# Patient Record
Sex: FEMALE | Race: WHITE | NOT HISPANIC OR LATINO | Employment: FULL TIME | URBAN - NONMETROPOLITAN AREA
[De-identification: names, ages, dates, MRNs, and addresses within clinical notes are randomized per-mention and may not be internally consistent; named-entity substitution may affect disease eponyms.]

---

## 2023-03-08 LAB — URINE CULTURE: ABNORMAL

## 2023-03-13 PROBLEM — F17.200 NICOTINE DEPENDENCE: Status: ACTIVE | Noted: 2023-03-13

## 2023-03-13 PROBLEM — O02.1 ABORTION, MISSED (HHS-HCC): Status: ACTIVE | Noted: 2023-03-13

## 2023-03-13 PROBLEM — B37.9 YEAST INFECTION: Status: ACTIVE | Noted: 2023-03-13

## 2023-03-13 PROBLEM — I73.9 CLAUDICATION OF RIGHT LOWER EXTREMITY (CMS-HCC): Status: ACTIVE | Noted: 2023-03-13

## 2023-03-13 PROBLEM — F33.9 DEPRESSION, MAJOR, RECURRENT (CMS-HCC): Status: ACTIVE | Noted: 2023-03-13

## 2023-03-13 PROBLEM — L02.91 ABSCESS: Status: ACTIVE | Noted: 2023-03-13

## 2023-03-13 PROBLEM — J01.90 SINUSITIS, ACUTE: Status: ACTIVE | Noted: 2023-03-13

## 2023-03-13 PROBLEM — E28.2 POLYCYSTIC OVARIES: Status: ACTIVE | Noted: 2023-03-13

## 2023-03-13 RX ORDER — MUPIROCIN 20 MG/G
OINTMENT TOPICAL
COMMUNITY
Start: 2022-11-15 | End: 2024-06-06 | Stop reason: ALTCHOICE

## 2023-03-13 RX ORDER — SERTRALINE HYDROCHLORIDE 25 MG/1
1 TABLET, FILM COATED ORAL DAILY
COMMUNITY
Start: 2022-09-01 | End: 2023-03-16

## 2023-03-13 RX ORDER — ETONOGESTREL AND ETHINYL ESTRADIOL VAGINAL RING .015; .12 MG/D; MG/D
RING VAGINAL
COMMUNITY
Start: 2022-07-21 | End: 2024-04-01 | Stop reason: WASHOUT

## 2023-03-16 ENCOUNTER — OFFICE VISIT (OUTPATIENT)
Dept: PRIMARY CARE | Facility: CLINIC | Age: 23
End: 2023-03-16
Payer: COMMERCIAL

## 2023-03-16 VITALS
HEIGHT: 65 IN | BODY MASS INDEX: 40.65 KG/M2 | OXYGEN SATURATION: 97 % | SYSTOLIC BLOOD PRESSURE: 113 MMHG | WEIGHT: 244 LBS | HEART RATE: 75 BPM | DIASTOLIC BLOOD PRESSURE: 77 MMHG

## 2023-03-16 DIAGNOSIS — F41.9 ANXIETY: ICD-10-CM

## 2023-03-16 DIAGNOSIS — F33.1 MODERATE EPISODE OF RECURRENT MAJOR DEPRESSIVE DISORDER (MULTI): Primary | ICD-10-CM

## 2023-03-16 PROCEDURE — 3008F BODY MASS INDEX DOCD: CPT | Performed by: PHYSICIAN ASSISTANT

## 2023-03-16 PROCEDURE — 99214 OFFICE O/P EST MOD 30 MIN: CPT | Performed by: PHYSICIAN ASSISTANT

## 2023-03-16 PROCEDURE — 1036F TOBACCO NON-USER: CPT | Performed by: PHYSICIAN ASSISTANT

## 2023-03-16 RX ORDER — ONDANSETRON 8 MG/1
TABLET, ORALLY DISINTEGRATING ORAL
COMMUNITY
Start: 2023-03-10 | End: 2023-11-10

## 2023-03-16 RX ORDER — BUSPIRONE HYDROCHLORIDE 5 MG/1
TABLET ORAL
Qty: 130 TABLET | Refills: 1 | Status: SHIPPED | OUTPATIENT
Start: 2023-03-16 | End: 2023-03-16

## 2023-03-16 RX ORDER — KETOROLAC TROMETHAMINE 10 MG/1
TABLET, FILM COATED ORAL
COMMUNITY
Start: 2023-03-07 | End: 2024-05-22 | Stop reason: WASHOUT

## 2023-03-16 RX ORDER — CIPROFLOXACIN 500 MG/1
TABLET ORAL
COMMUNITY
Start: 2023-03-07 | End: 2024-01-23 | Stop reason: ALTCHOICE

## 2023-03-16 RX ORDER — SERTRALINE HYDROCHLORIDE 50 MG/1
50 TABLET, FILM COATED ORAL DAILY
Qty: 30 TABLET | Refills: 11 | Status: SHIPPED | OUTPATIENT
Start: 2023-03-16 | End: 2023-03-16

## 2023-03-16 NOTE — PROGRESS NOTES
Subjective   Patient ID: Louise Santoro is a 22 y.o. female who presents for Follow-up (Hospital F/U.).    HPI     Review of Systems    Objective   There were no vitals taken for this visit.    Physical Exam    Assessment/Plan

## 2023-03-16 NOTE — PROGRESS NOTES
"Subjective   Patient ID: Louise Santoro is a 22 y.o. female who presents for Follow-up (Hospital F/U.).  HPI  Patient presents for follow-up of UTI.  Patient was diagnosed UTI last week and treated with Cipro.  Culture was inconclusive.  Patient reports good results with Cipro    Patient requesting increase in sertraline and possible addition of another agent for anxiety.  Patient reports having significant increase in stress load while taking care of an ailing family member as well as getting a promotion at work.  Review of Systems    Constitutional:  See HPI     Neurologic:  Alert and oriented X4, No numbness, No tingling.    Psychiatric: See HPI  All other systems are negative     Objective     /77   Pulse 75   Ht 1.651 m (5' 5\")   Wt 111 kg (244 lb)   SpO2 97%   BMI 40.60 kg/m²     Physical Exam    General:  Alert and oriented, No acute distress.    Eye:  Pupils are equal, round and reactive to light, Normal conjunctiva.    HENT:  Normocephalic,   Neck:  Supple    Respiratory: Respirations are non-labored   Musculoskeletal: Normal ROM and strength  Integumentary:  Warm, Dry, Intact, No pallor, No rash.    Neurologic:  Alert, Oriented, Normal sensory, Cranial Nerves II-XII are grossly intact  Psychiatric:  Cooperative, Appropriate mood & affect.    Assessment/Plan   Depression: Increase sertraline 50 mg daily    Anxiety: Added buspirone upward titrating dose.  Patient counseled to stop at what ever dose alleviates anxiety.  Follow-up in 1 month.  Problem List Items Addressed This Visit       Depression, major, recurrent (CMS/HCC) - Primary    Relevant Medications    sertraline (Zoloft) 50 mg tablet     Other Visit Diagnoses       Anxiety        Relevant Medications    busPIRone (Buspar) 5 mg tablet            Final diagnoses:   [F33.1] Moderate episode of recurrent major depressive disorder (CMS/HCC)   [F41.9] Anxiety      "

## 2023-04-18 ENCOUNTER — HOSPITAL ENCOUNTER (OUTPATIENT)
Age: 23
Discharge: HOME | End: 2023-04-18
Payer: COMMERCIAL

## 2023-04-18 DIAGNOSIS — O20.0: Primary | ICD-10-CM

## 2023-04-18 DIAGNOSIS — Z3A.00: ICD-10-CM

## 2023-04-18 LAB
DEPRECATED RDW RBC: 37.1 FL (ref 35.1–43.9)
ERYTHROCYTE [DISTWIDTH] IN BLOOD: 11.9 % (ref 11.6–14.6)
HCT VFR BLD AUTO: 39.8 % (ref 37–47)
HEMOGLOBIN: 14.1 G/DL (ref 12–15)
HGB BLD-MCNC: 14.1 G/DL (ref 12–15)
MCV RBC: 85.6 FL (ref 81–99)
MEAN CORP HGB CONC: 35.4 G/DL (ref 32–36)
MEAN PLATELET VOL.: 10.9 FL (ref 6.2–12)
PLATELET # BLD: 226 K/MM3 (ref 150–450)
PLATELET COUNT: 226 K/MM3 (ref 150–450)
RBC # BLD AUTO: 4.65 M/MM3 (ref 4.2–5.4)
RBC DISTRIBUTION WIDTH CV: 11.9 % (ref 11.6–14.6)
RBC DISTRIBUTION WIDTH SD: 37.1 FL (ref 35.1–43.9)
WBC # BLD AUTO: 6.4 K/MM3 (ref 4.4–11)
WHITE BLOOD COUNT: 6.4 K/MM3 (ref 4.4–11)

## 2023-04-18 PROCEDURE — 36415 COLL VENOUS BLD VENIPUNCTURE: CPT

## 2023-04-18 PROCEDURE — 85027 COMPLETE CBC AUTOMATED: CPT

## 2023-04-18 PROCEDURE — 86900 BLOOD TYPING SEROLOGIC ABO: CPT

## 2023-04-18 PROCEDURE — 86901 BLOOD TYPING SEROLOGIC RH(D): CPT

## 2023-04-20 ENCOUNTER — APPOINTMENT (OUTPATIENT)
Dept: PRIMARY CARE | Facility: CLINIC | Age: 23
End: 2023-04-20
Payer: COMMERCIAL

## 2023-05-08 ENCOUNTER — HOSPITAL ENCOUNTER (OUTPATIENT)
Dept: HOSPITAL 100 - SDC | Age: 23
Discharge: HOME | End: 2023-05-08
Payer: COMMERCIAL

## 2023-05-08 VITALS
SYSTOLIC BLOOD PRESSURE: 96 MMHG | RESPIRATION RATE: 16 BRPM | OXYGEN SATURATION: 99 % | HEART RATE: 97 BPM | TEMPERATURE: 97.7 F | DIASTOLIC BLOOD PRESSURE: 59 MMHG

## 2023-05-08 VITALS
DIASTOLIC BLOOD PRESSURE: 65 MMHG | SYSTOLIC BLOOD PRESSURE: 127 MMHG | HEART RATE: 83 BPM | RESPIRATION RATE: 16 BRPM | OXYGEN SATURATION: 100 %

## 2023-05-08 VITALS
OXYGEN SATURATION: 100 % | SYSTOLIC BLOOD PRESSURE: 96 MMHG | RESPIRATION RATE: 16 BRPM | HEART RATE: 85 BPM | DIASTOLIC BLOOD PRESSURE: 74 MMHG

## 2023-05-08 VITALS
DIASTOLIC BLOOD PRESSURE: 74 MMHG | HEART RATE: 77 BPM | TEMPERATURE: 97.52 F | SYSTOLIC BLOOD PRESSURE: 127 MMHG | RESPIRATION RATE: 16 BRPM | OXYGEN SATURATION: 100 %

## 2023-05-08 VITALS
DIASTOLIC BLOOD PRESSURE: 74 MMHG | TEMPERATURE: 98.4 F | OXYGEN SATURATION: 99 % | RESPIRATION RATE: 8 BRPM | HEART RATE: 100 BPM | SYSTOLIC BLOOD PRESSURE: 127 MMHG

## 2023-05-08 VITALS
DIASTOLIC BLOOD PRESSURE: 60 MMHG | RESPIRATION RATE: 16 BRPM | SYSTOLIC BLOOD PRESSURE: 127 MMHG | OXYGEN SATURATION: 97 % | HEART RATE: 83 BPM

## 2023-05-08 VITALS
DIASTOLIC BLOOD PRESSURE: 74 MMHG | HEART RATE: 90 BPM | OXYGEN SATURATION: 99 % | SYSTOLIC BLOOD PRESSURE: 95 MMHG | RESPIRATION RATE: 16 BRPM

## 2023-05-08 DIAGNOSIS — O02.1: Primary | ICD-10-CM

## 2023-05-08 LAB
DEPRECATED RDW RBC: 36 FL (ref 35.1–43.9)
ERYTHROCYTE [DISTWIDTH] IN BLOOD: 11.9 % (ref 11.6–14.6)
HCT VFR BLD AUTO: 38.2 % (ref 37–47)
HEMOGLOBIN: 13.1 G/DL (ref 12–15)
HGB BLD-MCNC: 13.1 G/DL (ref 12–15)
MCV RBC: 84.9 FL (ref 81–99)
MEAN CORP HGB CONC: 34.3 G/DL (ref 32–36)
MEAN PLATELET VOL.: 10.9 FL (ref 6.2–12)
PLATELET # BLD: 218 K/MM3 (ref 150–450)
PLATELET COUNT: 218 K/MM3 (ref 150–450)
RBC # BLD AUTO: 4.5 M/MM3 (ref 4.2–5.4)
RBC DISTRIBUTION WIDTH CV: 11.9 % (ref 11.6–14.6)
RBC DISTRIBUTION WIDTH SD: 36 FL (ref 35.1–43.9)
WBC # BLD AUTO: 10.6 K/MM3 (ref 4.4–11)
WHITE BLOOD COUNT: 10.6 K/MM3 (ref 4.4–11)

## 2023-05-08 PROCEDURE — 88305 TISSUE EXAM BY PATHOLOGIST: CPT

## 2023-05-08 PROCEDURE — 86901 BLOOD TYPING SEROLOGIC RH(D): CPT

## 2023-05-08 PROCEDURE — 85027 COMPLETE CBC AUTOMATED: CPT

## 2023-05-08 PROCEDURE — 86850 RBC ANTIBODY SCREEN: CPT

## 2023-05-08 PROCEDURE — 59820 CARE OF MISCARRIAGE: CPT

## 2023-05-08 PROCEDURE — 86900 BLOOD TYPING SEROLOGIC ABO: CPT

## 2023-05-08 RX ADMIN — HYDROCODONE BITARTRATE AND ACETAMINOPHEN 1 TABLET: 5; 325 TABLET ORAL at 12:31

## 2023-05-23 LAB — PATHOLOGY SPECIMEN OB: (no result)

## 2023-09-14 ENCOUNTER — TELEPHONE (OUTPATIENT)
Dept: PRIMARY CARE | Facility: CLINIC | Age: 23
End: 2023-09-14
Payer: COMMERCIAL

## 2023-09-14 NOTE — TELEPHONE ENCOUNTER
[facility] patient. C/O ill since Ghassan 9/10/23 with chest tightness, cough, aches, HA, congestion, some loss of taste. Had two positive home COVID tests.  Was supposed to RTW tomorrow; however has a fever of 102.0.  Needs work excuse for tomorrow, 9/15/23.  States hoping to RTW on Monday, 9/18/23.

## 2023-11-01 ENCOUNTER — PHARMACY VISIT (OUTPATIENT)
Dept: PHARMACY | Facility: CLINIC | Age: 23
End: 2023-11-01
Payer: MEDICARE

## 2023-11-01 PROCEDURE — RXMED WILLOW AMBULATORY MEDICATION CHARGE

## 2023-11-01 RX ORDER — CEPHALEXIN 500 MG/1
CAPSULE ORAL
Qty: 40 CAPSULE | Refills: 0 | OUTPATIENT
Start: 2023-10-31 | End: 2024-05-16 | Stop reason: WASHOUT

## 2023-11-09 ENCOUNTER — PHARMACY VISIT (OUTPATIENT)
Dept: PHARMACY | Facility: CLINIC | Age: 23
End: 2023-11-09
Payer: MEDICARE

## 2023-11-09 PROCEDURE — RXMED WILLOW AMBULATORY MEDICATION CHARGE

## 2023-11-10 ENCOUNTER — HOSPITAL ENCOUNTER (EMERGENCY)
Facility: HOSPITAL | Age: 23
Discharge: HOME | End: 2023-11-10
Attending: EMERGENCY MEDICINE
Payer: COMMERCIAL

## 2023-11-10 ENCOUNTER — PHARMACY VISIT (OUTPATIENT)
Dept: PHARMACY | Facility: CLINIC | Age: 23
End: 2023-11-10
Payer: MEDICARE

## 2023-11-10 VITALS
WEIGHT: 260 LBS | DIASTOLIC BLOOD PRESSURE: 82 MMHG | OXYGEN SATURATION: 98 % | BODY MASS INDEX: 43.32 KG/M2 | HEIGHT: 65 IN | RESPIRATION RATE: 18 BRPM | HEART RATE: 90 BPM | TEMPERATURE: 98.3 F | SYSTOLIC BLOOD PRESSURE: 136 MMHG

## 2023-11-10 DIAGNOSIS — R19.7 DIARRHEA, UNSPECIFIED TYPE: ICD-10-CM

## 2023-11-10 DIAGNOSIS — R10.84 GENERALIZED ABDOMINAL PAIN: Primary | ICD-10-CM

## 2023-11-10 LAB
ANION GAP SERPL CALC-SCNC: 11 MMOL/L (ref 10–20)
B-HCG SERPL-ACNC: <2 MIU/ML
BASOPHILS # BLD AUTO: 0.04 X10*3/UL (ref 0–0.1)
BASOPHILS NFR BLD AUTO: 0.5 %
BUN SERPL-MCNC: 10 MG/DL (ref 6–23)
CALCIUM SERPL-MCNC: 9.3 MG/DL (ref 8.6–10.3)
CHLORIDE SERPL-SCNC: 102 MMOL/L (ref 98–107)
CO2 SERPL-SCNC: 27 MMOL/L (ref 21–32)
CREAT SERPL-MCNC: 0.79 MG/DL (ref 0.5–1.05)
EOSINOPHIL # BLD AUTO: 0.08 X10*3/UL (ref 0–0.7)
EOSINOPHIL NFR BLD AUTO: 1.1 %
ERYTHROCYTE [DISTWIDTH] IN BLOOD BY AUTOMATED COUNT: 11.9 % (ref 11.5–14.5)
GFR SERPL CREATININE-BSD FRML MDRD: >90 ML/MIN/1.73M*2
GLUCOSE SERPL-MCNC: 96 MG/DL (ref 74–99)
HCT VFR BLD AUTO: 43.4 % (ref 36–46)
HGB BLD-MCNC: 15.1 G/DL (ref 12–16)
IMM GRANULOCYTES # BLD AUTO: 0.02 X10*3/UL (ref 0–0.7)
IMM GRANULOCYTES NFR BLD AUTO: 0.3 % (ref 0–0.9)
LACTATE SERPL-SCNC: 0.7 MMOL/L (ref 0.4–2)
LYMPHOCYTES # BLD AUTO: 1.32 X10*3/UL (ref 1.2–4.8)
LYMPHOCYTES NFR BLD AUTO: 18 %
MCH RBC QN AUTO: 29.5 PG (ref 26–34)
MCHC RBC AUTO-ENTMCNC: 34.8 G/DL (ref 32–36)
MCV RBC AUTO: 85 FL (ref 80–100)
MONOCYTES # BLD AUTO: 0.46 X10*3/UL (ref 0.1–1)
MONOCYTES NFR BLD AUTO: 6.3 %
NEUTROPHILS # BLD AUTO: 5.41 X10*3/UL (ref 1.2–7.7)
NEUTROPHILS NFR BLD AUTO: 73.8 %
NRBC BLD-RTO: 0 /100 WBCS (ref 0–0)
PLATELET # BLD AUTO: 214 X10*3/UL (ref 150–450)
POTASSIUM SERPL-SCNC: 3.5 MMOL/L (ref 3.5–5.3)
RBC # BLD AUTO: 5.11 X10*6/UL (ref 4–5.2)
SODIUM SERPL-SCNC: 136 MMOL/L (ref 136–145)
WBC # BLD AUTO: 7.3 X10*3/UL (ref 4.4–11.3)

## 2023-11-10 PROCEDURE — 36415 COLL VENOUS BLD VENIPUNCTURE: CPT | Performed by: EMERGENCY MEDICINE

## 2023-11-10 PROCEDURE — 99285 EMERGENCY DEPT VISIT HI MDM: CPT | Performed by: EMERGENCY MEDICINE

## 2023-11-10 PROCEDURE — 80048 BASIC METABOLIC PNL TOTAL CA: CPT | Performed by: EMERGENCY MEDICINE

## 2023-11-10 PROCEDURE — 85025 COMPLETE CBC W/AUTO DIFF WBC: CPT | Performed by: EMERGENCY MEDICINE

## 2023-11-10 PROCEDURE — 96360 HYDRATION IV INFUSION INIT: CPT

## 2023-11-10 PROCEDURE — 2500000004 HC RX 250 GENERAL PHARMACY W/ HCPCS (ALT 636 FOR OP/ED): Performed by: EMERGENCY MEDICINE

## 2023-11-10 PROCEDURE — 84702 CHORIONIC GONADOTROPIN TEST: CPT | Performed by: EMERGENCY MEDICINE

## 2023-11-10 PROCEDURE — RXMED WILLOW AMBULATORY MEDICATION CHARGE

## 2023-11-10 PROCEDURE — 99283 EMERGENCY DEPT VISIT LOW MDM: CPT | Mod: 25

## 2023-11-10 PROCEDURE — 83605 ASSAY OF LACTIC ACID: CPT | Performed by: EMERGENCY MEDICINE

## 2023-11-10 RX ORDER — ONDANSETRON 4 MG/1
4 TABLET, ORALLY DISINTEGRATING ORAL EVERY 8 HOURS PRN
Qty: 15 TABLET | Refills: 0 | Status: SHIPPED | OUTPATIENT
Start: 2023-11-10 | End: 2024-01-23 | Stop reason: WASHOUT

## 2023-11-10 RX ORDER — DICYCLOMINE HYDROCHLORIDE 20 MG/1
20 TABLET ORAL
Qty: 30 TABLET | Refills: 0 | Status: SHIPPED | OUTPATIENT
Start: 2023-11-10 | End: 2024-01-23 | Stop reason: ALTCHOICE

## 2023-11-10 RX ADMIN — SODIUM CHLORIDE 1000 ML: 9 INJECTION, SOLUTION INTRAVENOUS at 06:59

## 2023-11-10 ASSESSMENT — COLUMBIA-SUICIDE SEVERITY RATING SCALE - C-SSRS
2. HAVE YOU ACTUALLY HAD ANY THOUGHTS OF KILLING YOURSELF?: NO
6. HAVE YOU EVER DONE ANYTHING, STARTED TO DO ANYTHING, OR PREPARED TO DO ANYTHING TO END YOUR LIFE?: NO
1. IN THE PAST MONTH, HAVE YOU WISHED YOU WERE DEAD OR WISHED YOU COULD GO TO SLEEP AND NOT WAKE UP?: NO

## 2023-11-10 ASSESSMENT — PAIN DESCRIPTION - DESCRIPTORS
DESCRIPTORS: ACHING
DESCRIPTORS: ACHING

## 2023-11-10 ASSESSMENT — PAIN - FUNCTIONAL ASSESSMENT: PAIN_FUNCTIONAL_ASSESSMENT: 0-10

## 2023-11-10 ASSESSMENT — PAIN DESCRIPTION - LOCATION: LOCATION: ABDOMEN

## 2023-11-10 ASSESSMENT — PAIN SCALES - GENERAL: PAINLEVEL_OUTOF10: 4

## 2023-11-10 ASSESSMENT — PAIN DESCRIPTION - PAIN TYPE: TYPE: ACUTE PAIN

## 2023-11-10 NOTE — PROGRESS NOTES
Emergency Medicine Transition of Care Note.    I received Louise Santoro in signout from Dr. Carmona.  Please see the previous ED provider note for all HPI, PE and MDM up to the time of signout at 0700. This is in addition to the primary record.    In brief Louise Santoro is an 22 y.o. female presenting for   Chief Complaint   Patient presents with    Abdominal Pain     Pt states she has stepped on a bee 10/28 minute clinic started her on Keflex. She since has had persistent diarrhea and cramping since she had been on Keflex. She stopped taking it but has still been having diarrhea. Pt reports up to 15 BM's a day. Denies fever. Chills. Nausea/ vomiting. Mom and dad at bedside.      At the time of signout we were awaiting: Lab work and stool sample.    Diagnoses as of 11/10/23 0747   Generalized abdominal pain   Diarrhea, unspecified type       Medical Decision Making    Patient reevaluated and resting comfortably.  Benign abdominal exam.  Lab work otherwise unremarkable.  Patient encouraged to give a stool sample but does not feel the urge to move her bowels.  I did advise that she could stay in the emergency department to give a sample however wishes to go home.  Will be treated with Bentyl and Zofran at home.  Advised on follow-up with primary care.  Stable at time of discharge.    Final diagnoses:   [R10.84] Generalized abdominal pain   [R19.7] Diarrhea, unspecified type           Procedure  Procedures    Ignacio Alexandre DO

## 2023-11-10 NOTE — Clinical Note
Louise Santoro was seen and treated in our emergency department on 11/10/2023.  She may return to work on 11/13/2023.       If you have any questions or concerns, please don't hesitate to call.      Ignacio Skinner, DO

## 2023-11-10 NOTE — ED PROVIDER NOTES
HPI   Chief Complaint   Patient presents with   • Abdominal Pain     Pt states she has stepped on a bee 10/28 minute clinic started her on Keflex. She since has had persistent diarrhea and cramping since she had been on Keflex. She stopped taking it but has still been having diarrhea. Pt reports up to 15 BM's a day. Denies fever. Chills. Nausea/ vomiting. Mom and dad at bedside.        22-year-old female presents with abdominal pain and at least a 2-day history of extremely malodorous loose stools or diarrhea.  Patient was on Keflex prior to the onset of the symptoms.  Patient is concerned she could have C. difficile.  Patient is complaining of some intermittent mild to moderate nausea.  Patient denies any blood in the stool.      History provided by:  Patient                      No data recorded                Patient History   Past Medical History:   Diagnosis Date   • Lactose intolerance, unspecified     Lactose intolerance   • Localized swelling, mass and lump, unspecified upper limb 05/23/2022    Axillary lump   • Pain in right lower leg 04/06/2022    Right calf pain   • Personal history of other diseases of the respiratory system     History of asthma     Past Surgical History:   Procedure Laterality Date   • OTHER SURGICAL HISTORY  04/13/2021    Briggs tooth extraction   • OTHER SURGICAL HISTORY  04/06/2022    Tonsillectomy     Family History   Problem Relation Name Age of Onset   • Other (diabetes mellitus due to underlying condition with hyperglycemia unspecified whether long term insulin use) Mother     • Kidney cancer Mother     • Other (tonsil cancer) Mother     • Thyroid disease Mother     • Other (mental problems) Mother     • Other (factor V inhibitor disorder) Father     • Hypertension Father     • Thyroid disease Sister     • Other (mental problems) Sister     • Other (mental problems) Brother       Social History     Tobacco Use   • Smoking status: Never   • Smokeless tobacco: Never   Vaping Use    • Vaping Use: Every day   • Substances: Nicotine   • Devices: Disposable   Substance Use Topics   • Alcohol use: Not on file   • Drug use: Never       Physical Exam   ED Triage Vitals [11/10/23 0642]   Temp Heart Rate Resp BP   36.8 °C (98.3 °F) 105 18 140/90      SpO2 Temp src Heart Rate Source Patient Position   98 % -- -- Sitting      BP Location FiO2 (%)     Left arm --       Physical Exam  Vitals and nursing note reviewed.   Constitutional:       General: She is not in acute distress.     Appearance: She is well-developed.   HENT:      Head: Normocephalic and atraumatic.   Eyes:      Conjunctiva/sclera: Conjunctivae normal.   Cardiovascular:      Rate and Rhythm: Normal rate and regular rhythm.      Heart sounds: No murmur heard.  Pulmonary:      Effort: Pulmonary effort is normal. No respiratory distress.      Breath sounds: Normal breath sounds.   Abdominal:      General: Abdomen is protuberant.      Palpations: Abdomen is soft.      Tenderness: There is no abdominal tenderness.      Comments: Mild periumbilical tenderness to palpation.   Musculoskeletal:         General: No swelling.      Cervical back: Neck supple.   Skin:     General: Skin is warm and dry.      Capillary Refill: Capillary refill takes less than 2 seconds.   Neurological:      Mental Status: She is alert.   Psychiatric:         Mood and Affect: Mood normal.       ED Course & MDM        Medical Decision Making      Procedure  Procedures

## 2023-11-11 ENCOUNTER — LAB REQUISITION (OUTPATIENT)
Dept: LAB | Facility: HOSPITAL | Age: 23
End: 2023-11-11
Payer: COMMERCIAL

## 2023-11-11 DIAGNOSIS — R19.7 DIARRHEA, UNSPECIFIED: ICD-10-CM

## 2023-12-02 ENCOUNTER — PHARMACY VISIT (OUTPATIENT)
Dept: PHARMACY | Facility: CLINIC | Age: 23
End: 2023-12-02
Payer: MEDICARE

## 2023-12-02 PROCEDURE — RXMED WILLOW AMBULATORY MEDICATION CHARGE

## 2024-01-04 PROCEDURE — RXMED WILLOW AMBULATORY MEDICATION CHARGE

## 2024-01-07 ENCOUNTER — PHARMACY VISIT (OUTPATIENT)
Dept: PHARMACY | Facility: CLINIC | Age: 24
End: 2024-01-07
Payer: MEDICARE

## 2024-01-23 ENCOUNTER — OFFICE VISIT (OUTPATIENT)
Dept: PRIMARY CARE | Facility: CLINIC | Age: 24
End: 2024-01-23
Payer: COMMERCIAL

## 2024-01-23 VITALS
WEIGHT: 279 LBS | DIASTOLIC BLOOD PRESSURE: 82 MMHG | BODY MASS INDEX: 46.48 KG/M2 | SYSTOLIC BLOOD PRESSURE: 129 MMHG | HEIGHT: 65 IN | TEMPERATURE: 97.8 F | HEART RATE: 95 BPM

## 2024-01-23 DIAGNOSIS — J45.909 ASTHMA, UNSPECIFIED ASTHMA SEVERITY, UNSPECIFIED WHETHER COMPLICATED, UNSPECIFIED WHETHER PERSISTENT (HHS-HCC): Primary | ICD-10-CM

## 2024-01-23 DIAGNOSIS — F33.1 MODERATE EPISODE OF RECURRENT MAJOR DEPRESSIVE DISORDER (MULTI): ICD-10-CM

## 2024-01-23 DIAGNOSIS — F41.9 ANXIETY: ICD-10-CM

## 2024-01-23 DIAGNOSIS — T14.8XXA TRAUMATIC HEMATOMA: ICD-10-CM

## 2024-01-23 PROCEDURE — 99214 OFFICE O/P EST MOD 30 MIN: CPT | Performed by: PHYSICIAN ASSISTANT

## 2024-01-23 PROCEDURE — 1036F TOBACCO NON-USER: CPT | Performed by: PHYSICIAN ASSISTANT

## 2024-01-23 PROCEDURE — 3008F BODY MASS INDEX DOCD: CPT | Performed by: PHYSICIAN ASSISTANT

## 2024-01-23 PROCEDURE — RXMED WILLOW AMBULATORY MEDICATION CHARGE

## 2024-01-23 RX ORDER — BUSPIRONE HYDROCHLORIDE 5 MG/1
5 TABLET ORAL 3 TIMES DAILY PRN
Qty: 90 TABLET | Refills: 11 | Status: SHIPPED | OUTPATIENT
Start: 2024-01-23 | End: 2025-01-22

## 2024-01-23 RX ORDER — SERTRALINE HYDROCHLORIDE 50 MG/1
50 TABLET, FILM COATED ORAL DAILY
Qty: 30 TABLET | Refills: 11 | Status: SHIPPED | OUTPATIENT
Start: 2024-01-23 | End: 2024-05-16

## 2024-01-23 RX ORDER — ALBUTEROL SULFATE 90 UG/1
2 AEROSOL, METERED RESPIRATORY (INHALATION) EVERY 6 HOURS PRN
Qty: 6.7 G | Refills: 5 | Status: SHIPPED | OUTPATIENT
Start: 2024-01-23 | End: 2025-01-22

## 2024-01-23 RX ORDER — LEVONORGESTREL AND ETHINYL ESTRADIOL 0.15-0.03
1 KIT ORAL DAILY
COMMUNITY
End: 2024-04-01 | Stop reason: SDUPTHER

## 2024-01-23 ASSESSMENT — PATIENT HEALTH QUESTIONNAIRE - PHQ9
1. LITTLE INTEREST OR PLEASURE IN DOING THINGS: NOT AT ALL
2. FEELING DOWN, DEPRESSED OR HOPELESS: NOT AT ALL
SUM OF ALL RESPONSES TO PHQ9 QUESTIONS 1 AND 2: 0

## 2024-01-23 NOTE — PROGRESS NOTES
"Subjective   Patient ID: Louise Santoro is a 23 y.o. female who presents for Fall (Fall injury Sunday morning going stairs and sliding down her buttocks.  Patient having left buttocks discomfort with abnormal lump.).  HPI  Patient presents for evaluation of left gluteal injury.  Patient accidentally slipped and fell down a few stairs at home and fell directly on the left gluteus causing discoloration, pain, and swelling.  No reduced range of motion or strength in the left leg.  Patient has been avoiding pressure and applying ice with some relief.    Review of Systems    Constitutional:  See HPI    Musculoskeletal: See HPI  Integumentary: See HPI  Neurologic:  Alert and oriented X4, No numbness, No tingling.    All other systems are negative   Objective     /82   Pulse 95   Temp 36.6 °C (97.8 °F) (Temporal)   Ht 1.651 m (5' 5\")   Wt 127 kg (279 lb)   BMI 46.43 kg/m²     Physical Exam    General:  Alert and oriented, No acute distress.    Eye:  Pupils are equal, round and reactive to light, Normal conjunctiva.    HENT:  Normocephalic,   Neck:  Supple    Respiratory: Respirations are non-labored   Musculoskeletal: Normal ROM and strength  Integumentary: Left gluteus with a 8 cm x 4 cm area of ecchymosis and induration  Neurologic:  Alert, Oriented, Normal sensory, Cranial Nerves II-XII are grossly intact  Psychiatric:  Cooperative, Appropriate mood & affect.    Assessment/Plan   Hematoma left gluteus: Continue ice and NSAIDs as needed.  Likely course reviewed.    BuSpar, Zoloft, and albuterol refilled at patient request.    Follow-up as scheduled or as needed.  Problem List Items Addressed This Visit       Depression, major, recurrent (CMS/HCC)    Relevant Medications    sertraline (Zoloft) 50 mg tablet     Other Visit Diagnoses       Asthma, unspecified asthma severity, unspecified whether complicated, unspecified whether persistent    -  Primary    Relevant Medications    albuterol (Ventolin HFA) 90 " mcg/actuation inhaler    Anxiety        Relevant Medications    busPIRone (Buspar) 5 mg tablet            Final diagnoses:   [F41.9] Anxiety   [F33.1] Moderate episode of recurrent major depressive disorder (CMS/[facility])   [J45.909] Asthma, unspecified asthma severity, unspecified whether complicated, unspecified whether persistent

## 2024-01-24 ENCOUNTER — APPOINTMENT (OUTPATIENT)
Dept: PRIMARY CARE | Facility: CLINIC | Age: 24
End: 2024-01-24
Payer: COMMERCIAL

## 2024-01-29 ENCOUNTER — PHARMACY VISIT (OUTPATIENT)
Dept: PHARMACY | Facility: CLINIC | Age: 24
End: 2024-01-29
Payer: MEDICARE

## 2024-04-01 ENCOUNTER — TELEPHONE (OUTPATIENT)
Dept: OBSTETRICS AND GYNECOLOGY | Facility: CLINIC | Age: 24
End: 2024-04-01
Payer: COMMERCIAL

## 2024-04-01 DIAGNOSIS — Z30.41 ENCOUNTER FOR SURVEILLANCE OF CONTRACEPTIVE PILLS: Primary | ICD-10-CM

## 2024-04-01 PROCEDURE — RXMED WILLOW AMBULATORY MEDICATION CHARGE

## 2024-04-01 RX ORDER — LEVONORGESTREL AND ETHINYL ESTRADIOL 0.15-0.03
1 KIT ORAL DAILY
Qty: 28 TABLET | Refills: 6 | Status: SHIPPED | OUTPATIENT
Start: 2024-04-01

## 2024-04-01 NOTE — TELEPHONE ENCOUNTER
Patient called and left a message stating when she saw here PCP last for some reason her birth control was cancelled. She sees Jacque and is not due for her yearly until August. Can you please send in a script for the Kwaku to her pharmacy.

## 2024-04-02 ENCOUNTER — PHARMACY VISIT (OUTPATIENT)
Dept: PHARMACY | Facility: CLINIC | Age: 24
End: 2024-04-02
Payer: MEDICARE

## 2024-04-18 PROCEDURE — RXMED WILLOW AMBULATORY MEDICATION CHARGE

## 2024-04-24 ENCOUNTER — PHARMACY VISIT (OUTPATIENT)
Dept: PHARMACY | Facility: CLINIC | Age: 24
End: 2024-04-24
Payer: MEDICARE

## 2024-04-24 PROCEDURE — RXMED WILLOW AMBULATORY MEDICATION CHARGE

## 2024-04-27 ENCOUNTER — PHARMACY VISIT (OUTPATIENT)
Dept: PHARMACY | Facility: CLINIC | Age: 24
End: 2024-04-27
Payer: MEDICARE

## 2024-05-08 ENCOUNTER — APPOINTMENT (OUTPATIENT)
Dept: CARDIOLOGY | Facility: HOSPITAL | Age: 24
End: 2024-05-08
Payer: COMMERCIAL

## 2024-05-08 ENCOUNTER — APPOINTMENT (OUTPATIENT)
Dept: RADIOLOGY | Facility: HOSPITAL | Age: 24
End: 2024-05-08
Payer: COMMERCIAL

## 2024-05-08 ENCOUNTER — HOSPITAL ENCOUNTER (EMERGENCY)
Facility: HOSPITAL | Age: 24
Discharge: HOME | End: 2024-05-08
Attending: EMERGENCY MEDICINE
Payer: COMMERCIAL

## 2024-05-08 VITALS
OXYGEN SATURATION: 97 % | BODY MASS INDEX: 43.32 KG/M2 | RESPIRATION RATE: 18 BRPM | TEMPERATURE: 98 F | HEART RATE: 78 BPM | HEIGHT: 65 IN | DIASTOLIC BLOOD PRESSURE: 80 MMHG | SYSTOLIC BLOOD PRESSURE: 122 MMHG | WEIGHT: 260 LBS

## 2024-05-08 DIAGNOSIS — R07.81 PLEURITIC CHEST PAIN: Primary | ICD-10-CM

## 2024-05-08 LAB
ALBUMIN SERPL BCP-MCNC: 4.5 G/DL (ref 3.4–5)
ALP SERPL-CCNC: 63 U/L (ref 33–110)
ALT SERPL W P-5'-P-CCNC: 19 U/L (ref 7–45)
ANION GAP SERPL CALC-SCNC: 12 MMOL/L (ref 10–20)
APTT PPP: 32 SECONDS (ref 27–38)
AST SERPL W P-5'-P-CCNC: 15 U/L (ref 9–39)
BASOPHILS # BLD AUTO: 0.03 X10*3/UL (ref 0–0.1)
BASOPHILS NFR BLD AUTO: 0.4 %
BILIRUB SERPL-MCNC: 0.3 MG/DL (ref 0–1.2)
BUN SERPL-MCNC: 11 MG/DL (ref 6–23)
CALCIUM SERPL-MCNC: 9.5 MG/DL (ref 8.6–10.3)
CARDIAC TROPONIN I PNL SERPL HS: <3 NG/L (ref 0–13)
CARDIAC TROPONIN I PNL SERPL HS: <3 NG/L (ref 0–13)
CHLORIDE SERPL-SCNC: 104 MMOL/L (ref 98–107)
CO2 SERPL-SCNC: 25 MMOL/L (ref 21–32)
CREAT SERPL-MCNC: 0.81 MG/DL (ref 0.5–1.05)
D DIMER PPP FEU-MCNC: 488 NG/ML FEU
EGFRCR SERPLBLD CKD-EPI 2021: >90 ML/MIN/1.73M*2
EOSINOPHIL # BLD AUTO: 0.04 X10*3/UL (ref 0–0.7)
EOSINOPHIL NFR BLD AUTO: 0.5 %
ERYTHROCYTE [DISTWIDTH] IN BLOOD BY AUTOMATED COUNT: 13.3 % (ref 11.5–14.5)
GLUCOSE SERPL-MCNC: 89 MG/DL (ref 74–99)
HCT VFR BLD AUTO: 43.7 % (ref 36–46)
HGB BLD-MCNC: 14.7 G/DL (ref 12–16)
IMM GRANULOCYTES # BLD AUTO: 0.03 X10*3/UL (ref 0–0.7)
IMM GRANULOCYTES NFR BLD AUTO: 0.4 % (ref 0–0.9)
INR PPP: 1 (ref 0.9–1.1)
LIPASE SERPL-CCNC: 23 U/L (ref 9–82)
LYMPHOCYTES # BLD AUTO: 3.2 X10*3/UL (ref 1.2–4.8)
LYMPHOCYTES NFR BLD AUTO: 38.3 %
MAGNESIUM SERPL-MCNC: 1.88 MG/DL (ref 1.6–2.4)
MCH RBC QN AUTO: 27.6 PG (ref 26–34)
MCHC RBC AUTO-ENTMCNC: 33.6 G/DL (ref 32–36)
MCV RBC AUTO: 82 FL (ref 80–100)
MONOCYTES # BLD AUTO: 0.36 X10*3/UL (ref 0.1–1)
MONOCYTES NFR BLD AUTO: 4.3 %
NEUTROPHILS # BLD AUTO: 4.7 X10*3/UL (ref 1.2–7.7)
NEUTROPHILS NFR BLD AUTO: 56.1 %
NRBC BLD-RTO: 0 /100 WBCS (ref 0–0)
PLATELET # BLD AUTO: 266 X10*3/UL (ref 150–450)
POTASSIUM SERPL-SCNC: 4.1 MMOL/L (ref 3.5–5.3)
PROT SERPL-MCNC: 7.7 G/DL (ref 6.4–8.2)
PROTHROMBIN TIME: 11.4 SECONDS (ref 9.8–12.8)
RBC # BLD AUTO: 5.32 X10*6/UL (ref 4–5.2)
SODIUM SERPL-SCNC: 137 MMOL/L (ref 136–145)
WBC # BLD AUTO: 8.4 X10*3/UL (ref 4.4–11.3)

## 2024-05-08 PROCEDURE — 84484 ASSAY OF TROPONIN QUANT: CPT | Performed by: PHYSICIAN ASSISTANT

## 2024-05-08 PROCEDURE — 83690 ASSAY OF LIPASE: CPT | Performed by: PHYSICIAN ASSISTANT

## 2024-05-08 PROCEDURE — 85730 THROMBOPLASTIN TIME PARTIAL: CPT | Performed by: PHYSICIAN ASSISTANT

## 2024-05-08 PROCEDURE — 71045 X-RAY EXAM CHEST 1 VIEW: CPT

## 2024-05-08 PROCEDURE — 99284 EMERGENCY DEPT VISIT MOD MDM: CPT | Mod: 25

## 2024-05-08 PROCEDURE — 93005 ELECTROCARDIOGRAM TRACING: CPT

## 2024-05-08 PROCEDURE — 85025 COMPLETE CBC W/AUTO DIFF WBC: CPT | Performed by: PHYSICIAN ASSISTANT

## 2024-05-08 PROCEDURE — 83735 ASSAY OF MAGNESIUM: CPT | Performed by: PHYSICIAN ASSISTANT

## 2024-05-08 PROCEDURE — 85379 FIBRIN DEGRADATION QUANT: CPT | Performed by: PHYSICIAN ASSISTANT

## 2024-05-08 PROCEDURE — 36415 COLL VENOUS BLD VENIPUNCTURE: CPT | Performed by: PHYSICIAN ASSISTANT

## 2024-05-08 PROCEDURE — 80053 COMPREHEN METABOLIC PANEL: CPT | Performed by: PHYSICIAN ASSISTANT

## 2024-05-08 PROCEDURE — 71045 X-RAY EXAM CHEST 1 VIEW: CPT | Performed by: RADIOLOGY

## 2024-05-08 PROCEDURE — 85610 PROTHROMBIN TIME: CPT | Performed by: PHYSICIAN ASSISTANT

## 2024-05-08 ASSESSMENT — ENCOUNTER SYMPTOMS
SEIZURES: 0
FEVER: 0
SORE THROAT: 0
DYSURIA: 0
DIARRHEA: 0
SHORTNESS OF BREATH: 1
ARTHRALGIAS: 0
PALPITATIONS: 0
BACK PAIN: 0
ABDOMINAL PAIN: 0
VOMITING: 0
CHILLS: 0
HEMATURIA: 0
COLOR CHANGE: 0
EYE PAIN: 0
COUGH: 1
NAUSEA: 0

## 2024-05-08 ASSESSMENT — PAIN SCALES - GENERAL
PAINLEVEL_OUTOF10: 7
PAINLEVEL_OUTOF10: 7
PAINLEVEL_OUTOF10: 6

## 2024-05-08 ASSESSMENT — HEART SCORE
ECG: NORMAL
TROPONIN: LESS THAN OR EQUAL TO NORMAL LIMIT
HEART SCORE: 1
AGE: <45
HISTORY: SLIGHTLY SUSPICIOUS
RISK FACTORS: 1-2 RISK FACTORS

## 2024-05-08 ASSESSMENT — COLUMBIA-SUICIDE SEVERITY RATING SCALE - C-SSRS
6. HAVE YOU EVER DONE ANYTHING, STARTED TO DO ANYTHING, OR PREPARED TO DO ANYTHING TO END YOUR LIFE?: NO
2. HAVE YOU ACTUALLY HAD ANY THOUGHTS OF KILLING YOURSELF?: NO
1. IN THE PAST MONTH, HAVE YOU WISHED YOU WERE DEAD OR WISHED YOU COULD GO TO SLEEP AND NOT WAKE UP?: NO

## 2024-05-08 ASSESSMENT — PAIN - FUNCTIONAL ASSESSMENT: PAIN_FUNCTIONAL_ASSESSMENT: 0-10

## 2024-05-08 NOTE — ED PROVIDER NOTES
Patient is a 23-year-old female who presents to the emergency room with a chief complaint of chest pain and shortness of breath.  She states that she has had symptoms for approximately a week.  She states that her chest pain is midsternal and worse with inspiration.  She also states that she has noticed some right leg pain and minimal swelling.  She describes the pain in her right leg as a charley horse.  She states that her father has factor V along with 2 of her siblings.  Patient states that she has never been tested for factor V.  She denies any fever or chills.  No nausea or vomiting.  Does report an occasional cough and states that she has a history of asthma, states that her asthma has been worse recently.           Review of Systems   Constitutional:  Negative for chills and fever.   HENT:  Negative for ear pain and sore throat.    Eyes:  Negative for pain and visual disturbance.   Respiratory:  Positive for cough and shortness of breath.    Cardiovascular:  Positive for chest pain. Negative for palpitations.   Gastrointestinal:  Negative for abdominal pain, diarrhea, nausea and vomiting.   Genitourinary:  Negative for dysuria and hematuria.   Musculoskeletal:  Negative for arthralgias and back pain.   Skin:  Negative for color change and rash.   Neurological:  Negative for seizures and syncope.   All other systems reviewed and are negative.       Physical Exam  Vitals and nursing note reviewed.   Constitutional:       General: She is not in acute distress.     Appearance: She is well-developed. She is not ill-appearing, toxic-appearing or diaphoretic.   HENT:      Head: Normocephalic and atraumatic.   Eyes:      Extraocular Movements: Extraocular movements intact.      Conjunctiva/sclera: Conjunctivae normal.   Cardiovascular:      Rate and Rhythm: Normal rate and regular rhythm.      Heart sounds: No murmur heard.  Pulmonary:      Effort: Pulmonary effort is normal. No respiratory distress.      Breath  sounds: Normal breath sounds. No decreased breath sounds, wheezing, rhonchi or rales.   Abdominal:      Palpations: Abdomen is soft.      Tenderness: There is no abdominal tenderness.   Musculoskeletal:         General: No swelling.      Cervical back: Normal range of motion and neck supple.   Skin:     General: Skin is warm and dry.      Capillary Refill: Capillary refill takes less than 2 seconds.   Neurological:      General: No focal deficit present.      Mental Status: She is alert.   Psychiatric:         Mood and Affect: Mood normal.          Labs Reviewed   CBC WITH AUTO DIFFERENTIAL - Abnormal       Result Value    WBC 8.4      nRBC 0.0      RBC 5.32 (*)     Hemoglobin 14.7      Hematocrit 43.7      MCV 82      MCH 27.6      MCHC 33.6      RDW 13.3      Platelets 266      Neutrophils % 56.1      Immature Granulocytes %, Automated 0.4      Lymphocytes % 38.3      Monocytes % 4.3      Eosinophils % 0.5      Basophils % 0.4      Neutrophils Absolute 4.70      Immature Granulocytes Absolute, Automated 0.03      Lymphocytes Absolute 3.20      Monocytes Absolute 0.36      Eosinophils Absolute 0.04      Basophils Absolute 0.03     COMPREHENSIVE METABOLIC PANEL - Normal    Glucose 89      Sodium 137      Potassium 4.1      Chloride 104      Bicarbonate 25      Anion Gap 12      Urea Nitrogen 11      Creatinine 0.81      eGFR >90      Calcium 9.5      Albumin 4.5      Alkaline Phosphatase 63      Total Protein 7.7      AST 15      Bilirubin, Total 0.3      ALT 19     MAGNESIUM - Normal    Magnesium 1.88     APTT - Normal    aPTT 32      Narrative:     The APTT is no longer used for monitoring Unfractionated Heparin Therapy. For monitoring Heparin Therapy, use the Heparin Assay.   PROTIME-INR - Normal    Protime 11.4      INR 1.0     D-DIMER, VTE EXCLUSION - Normal    D-Dimer, Quantitative VTE Exclusion 488      Narrative:     The VTE Exclusion D-Dimer assay is reported in ng/mL Fibrinogen Equivalent Units  (FEU).    Per 's instructions for use, a value of less than 500 ng/mL (FEU) may help to exclude DVT or PE in outpatients when the assay is used with a clinical pretest probability assessment.(AE must utilize and document eCalc 'Wells Score Deep Vein Thrombosis Risk' for DVT exclusion only. Emergency Department should utilize  Guidelines for Emergency Department Use of the VTE Exclusion D-Dimer and Clinical Pretest probability assessment model for DVT or PE exclusion.)   LIPASE - Normal    Lipase 23      Narrative:     Venipuncture immediately after or during the administration of Metamizole may lead to falsely low results. Testing should be performed immediately prior to Metamizole dosing.   SERIAL TROPONIN-INITIAL - Normal    Troponin I, High Sensitivity <3      Narrative:     Less than 99th percentile of normal range cutoff-  Female and children under 18 years old <14 ng/L; Male <21 ng/L: Negative  Repeat testing should be performed if clinically indicated.     Female and children under 18 years old 14-50 ng/L; Male 21-50 ng/L:  Consistent with possible cardiac damage and possible increased clinical   risk. Serial measurements may help to assess extent of myocardial damage.     >50 ng/L: Consistent with cardiac damage, increased clinical risk and  myocardial infarction. Serial measurements may help assess extent of   myocardial damage.      NOTE: Children less than 1 year old may have higher baseline troponin   levels and results should be interpreted in conjunction with the overall   clinical context.     NOTE: Troponin I testing is performed using a different   testing methodology at [facility] than at other   [facility]. Direct result comparisons should only   be made within the same method.   SERIAL TROPONIN, 1 HOUR - Normal    Troponin I, High Sensitivity <3      Narrative:     Less than 99th percentile of normal range cutoff-  Female and children under 18 years old <14  ng/L; Male <21 ng/L: Negative  Repeat testing should be performed if clinically indicated.     Female and children under 18 years old 14-50 ng/L; Male 21-50 ng/L:  Consistent with possible cardiac damage and possible increased clinical   risk. Serial measurements may help to assess extent of myocardial damage.     >50 ng/L: Consistent with cardiac damage, increased clinical risk and  myocardial infarction. Serial measurements may help assess extent of   myocardial damage.      NOTE: Children less than 1 year old may have higher baseline troponin   levels and results should be interpreted in conjunction with the overall   clinical context.     NOTE: Troponin I testing is performed using a different   testing methodology at [facility] than at other   [facility]. Direct result comparisons should only   be made within the same method.   TROPONIN SERIES- (INITIAL, 1 HR)    Narrative:     The following orders were created for panel order Troponin I Series, High Sensitivity (0, 1 HR).  Procedure                               Abnormality         Status                     ---------                               -----------         ------                     Troponin I, High Sensiti...[[id number]]  Normal              Final result               Troponin, High Sensitivi...[[id number]]  Normal              Final result                 Please view results for these tests on the individual orders.        XR chest 1 view   Final Result   No acute cardiopulmonary disease.        Signed by: Gurdeep Perkins 5/8/2024 6:48 PM   Dictation workstation:   [id number]           ECG 12 Lead    Performed by: Roma Alicea PA-C  Authorized by: Roma Alicea PA-C    ECG interpreted by ED Physician in the absence of a cardiologist: yes    Comments:      EKG shows normal sinus rhythm with a rate of 85 bpm.  No ST elevation.  ND interval is 114 ms and QRS duration is 92 ms.  EKG interpretation per myself,  Roma Alicea       Medical Decision Making  Patient is a 23-year-old female who presents to the emergency department with a chief complaint of chest pain and shortness of breath for approximately 1 week.  She reported pleuritic chest pain and some right lower leg swelling.  D-dimer was within normal limits.  Cardiac workup was otherwise unremarkable as well.  Patient will be discharged home with recommended follow-up with primary care physician return to any new or worsening symptoms.  Patient was seen by myself along with attending physician, Dr. Arora.    The patient was seen by the advanced practice provider.  I have personally performed a substantive portion of the encounter.  I have seen and examined the patient; agree with the workup, evaluation, MDM, management and diagnosis.  The care plan has been discussed.    I personally saw the patient and made/approved the management plan and take responsibility for the patient's management.   History: Chest pain, concern for possible blood clot.  This 23-year-old white female presents to the emerged part with complaint of chest pain symptoms and shortness of breath that she has been experiencing for the past week or so she admits to some pleuritic chest pain and some right lower extremity swelling.  She states that she has a family history of blood clots is concerned about the possibility of thus wanted to come to the ED for evaluation.  Exam: General patient is alert awake oriented appears to be no acute distress nontoxic obese.  ENT examinations unremarkable.  Heart initially was tachycardic regular without murmur.  Lungs are clear to auscultation bilateral respirations are easy and symmetric without retractions or tachypnea.  Abdomen is protuberant soft nontender palpation without rebound rigidity guarding noted skin is warm soft dry intact out rash.  There is mild bilateral lower extremity edema without pitting.  MDM: Patient was worked up for chest pain  symptoms with EKG, troponin delta troponin and a D-dimer in addition to regular electrolytes and a CBC.  The EKG was unremarkable for ischemic changes.  The initial troponin and repeat troponin both negative and D-dimer was within normal range.  The rest of lab work was unremarkable.  The patient was subsidy discharged home with a heart score of 1.  I did recommend to the patient that she follow-up with her primary care doctor to get checked for factor V Leiden since she had multiple family members that have tested positive.  Patient was discharged home in stable satisfactory condition.  Lenny Arora DO     DDX includes but not limited: pleurisy, viral illness, acute bronchitis, asthma, MI, PNA    Heart score is: 1    Amount and/or Complexity of Data Reviewed  Labs: ordered. Decision-making details documented in ED Course.  Radiology: ordered. Decision-making details documented in ED Course.  ECG/medicine tests: ordered and independent interpretation performed. Decision-making details documented in ED Course.         Diagnoses as of 05/09/24 0426   Pleuritic chest pain                    Roma Alicea PA-C  05/08/24 2042       Lenny Arora DO  05/09/24 0426

## 2024-05-11 LAB
ATRIAL RATE: 85 BPM
P AXIS: 62 DEGREES
P OFFSET: 204 MS
P ONSET: 158 MS
PR INTERVAL: 114 MS
Q ONSET: 215 MS
QRS COUNT: 14 BEATS
QRS DURATION: 92 MS
QT INTERVAL: 350 MS
QTC CALCULATION(BAZETT): 416 MS
QTC FREDERICIA: 393 MS
R AXIS: 74 DEGREES
T AXIS: 16 DEGREES
T OFFSET: 390 MS
VENTRICULAR RATE: 85 BPM

## 2024-05-16 ENCOUNTER — OFFICE VISIT (OUTPATIENT)
Dept: PRIMARY CARE | Facility: CLINIC | Age: 24
End: 2024-05-16
Payer: COMMERCIAL

## 2024-05-16 VITALS
HEART RATE: 98 BPM | DIASTOLIC BLOOD PRESSURE: 78 MMHG | SYSTOLIC BLOOD PRESSURE: 124 MMHG | WEIGHT: 279.8 LBS | OXYGEN SATURATION: 98 % | HEIGHT: 65 IN | BODY MASS INDEX: 46.62 KG/M2

## 2024-05-16 DIAGNOSIS — R19.7 DIARRHEA, UNSPECIFIED TYPE: ICD-10-CM

## 2024-05-16 DIAGNOSIS — F33.1 MODERATE EPISODE OF RECURRENT MAJOR DEPRESSIVE DISORDER (MULTI): ICD-10-CM

## 2024-05-16 DIAGNOSIS — Z83.2 FAMILY HISTORY OF FACTOR V DEFICIENCY: ICD-10-CM

## 2024-05-16 DIAGNOSIS — R10.11 RUQ PAIN: Primary | ICD-10-CM

## 2024-05-16 PROCEDURE — 99214 OFFICE O/P EST MOD 30 MIN: CPT | Performed by: STUDENT IN AN ORGANIZED HEALTH CARE EDUCATION/TRAINING PROGRAM

## 2024-05-16 PROCEDURE — RXMED WILLOW AMBULATORY MEDICATION CHARGE

## 2024-05-16 PROCEDURE — 1036F TOBACCO NON-USER: CPT | Performed by: STUDENT IN AN ORGANIZED HEALTH CARE EDUCATION/TRAINING PROGRAM

## 2024-05-16 RX ORDER — SERTRALINE HYDROCHLORIDE 100 MG/1
100 TABLET, FILM COATED ORAL DAILY
Qty: 30 TABLET | Refills: 11 | Status: SHIPPED | OUTPATIENT
Start: 2024-05-16 | End: 2025-05-16

## 2024-05-16 ASSESSMENT — PATIENT HEALTH QUESTIONNAIRE - PHQ9
SUM OF ALL RESPONSES TO PHQ9 QUESTIONS 1 & 2: 0
2. FEELING DOWN, DEPRESSED OR HOPELESS: NOT AT ALL
1. LITTLE INTEREST OR PLEASURE IN DOING THINGS: NOT AT ALL

## 2024-05-16 NOTE — ASSESSMENT & PLAN NOTE
- Chronic problem, unresolved, new to this provider, requires further workup and treatment   - Discussed with pt that we whould refer to hematology for further testing

## 2024-05-16 NOTE — PROGRESS NOTES
Subjective:  Louise Santoro is a 23 y.o. female who presents to clinic today for Establish Care      Encounter to establish care:    She notes that every time she eats 10-15 minutes later she has to go to the bathroom and has RUQ pain/cramping. When she goes to the bathroom she has some diarrhea and also sometimes not. Greasy rich foods cause diarrhea.     She has had 2 miscarriages in hte past year. She was on OCPs for one of these.    She is fixated on the vaping, specifically the motion of it.     She notes that her mood has been okay. She is taking zoloft 50mg and buspar 5mg as needed. She notes that she gets spacey with her buspar. She previuosly was on wellbutrin. She's not having any side effects.      Review of Systems    Assessment/Plan:  Louise Santoro is a 23 y.o. female with a history of PCOS, depression, nicotine dependence who presents to clinic today to address the following issues:   1. RUQ pain  US right upper quadrant      2. Diarrhea, unspecified type  US right upper quadrant      3. Family history of factor V deficiency  Referral to Hematology and Oncology      4. Moderate episode of recurrent major depressive disorder (Multi)  sertraline (Zoloft) 100 mg tablet        RUQ Pain/Diarrhea:  - Chronic problem, unresolved, new to this provider, requires further workup and treatment   - Discussed with pt that this combined with her examination makes me concerned for gallbladder pathology and an US was ordered for further evaluation    Problem List Items Addressed This Visit       Depression, major, recurrent (CMS-HCC)    Current Assessment & Plan     - Chronic problem, unresolved, new to this provider, requires further workup and treatment   - Discussed with pt that since she feels like mood has not improved I would recommend increasing dose of zoloft to 100mg daily from 50mg daily         Relevant Medications    sertraline (Zoloft) 100 mg tablet    Family history of factor V deficiency  "   Current Assessment & Plan     - Chronic problem, unresolved, new to this provider, requires further workup and treatment   - Discussed with pt that we whould refer to hematology for further testing         Relevant Orders    Referral to Hematology and Oncology     Other Visit Diagnoses       RUQ pain    -  Primary    Relevant Orders    US right upper quadrant    Diarrhea, unspecified type        Relevant Orders    US right upper quadrant            Patient Instructions   [email or web address]        Follow up: 1 month    Return precautions discussed.  An After Visit Summary was given to the patient.  All questions were answered and patient in agreement with plan.    Objective:  /78   Pulse 98   Ht 1.651 m (5' 5\")   Wt 127 kg (279 lb 12.8 oz)   SpO2 98%   BMI 46.56 kg/m²     Physical Exam  Vitals and nursing note reviewed.   Constitutional:       General: She is not in acute distress.     Appearance: Normal appearance.   HENT:      Head: Normocephalic and atraumatic.      Mouth/Throat:      Mouth: Mucous membranes are moist.   Eyes:      General: No scleral icterus.        Right eye: No discharge.         Left eye: No discharge.      Extraocular Movements: Extraocular movements intact.      Conjunctiva/sclera: Conjunctivae normal.   Pulmonary:      Effort: No respiratory distress.   Abdominal:      Palpations: Abdomen is soft.      Tenderness: There is abdominal tenderness (RUQ tenderness).      Comments: Galeas sign positive   Skin:     General: Skin is warm and dry.   Neurological:      General: No focal deficit present.      Mental Status: She is alert and oriented to person, place, and time.   Psychiatric:         Attention and Perception: Attention normal.         Mood and Affect: Mood normal.         Speech: Speech normal.         Behavior: Behavior normal.         Cognition and Memory: Cognition and memory normal.         Judgment: Judgment normal.         I spent 20 minutes " in total time for this visit including all related clinical activities before, during, and after the visit excluding other billable activities/procedure time.     Sabrina Davey MD

## 2024-05-16 NOTE — ASSESSMENT & PLAN NOTE
- Chronic problem, unresolved, new to this provider, requires further workup and treatment   - Discussed with pt that since she feels like mood has not improved I would recommend increasing dose of zoloft to 100mg daily from 50mg daily

## 2024-05-21 PROCEDURE — RXMED WILLOW AMBULATORY MEDICATION CHARGE

## 2024-05-21 NOTE — PROGRESS NOTES
CHIEF COMPLAINT  Referred by ER physician for CP/SOB    HISTORY OF PRESENT ILLNESS    ER course:  Patient presented to [facility] ED on 5/8/24 in the setting of chest pain and shortness of breath x 1 week. She reported her chest pain as mid-sternal and worse with inspiration. She also reported right leg pain with minimal swelling describing the pain similar to a charley horse. Patient's father and two of her sibling are + for Factor V; patient has never been tested. Lab work up was grossly negative. Chest x-ray was negative.    Patient still reports a constant, dull, aching, somewhat burning sensation in her chest (mid-sternal to left-sided). She also reports noticing a very forceful heart beat(s) with radiation to her neck and dizziness and then goes back to normal. She denies any new medications started. She does have family hx of thyroid disorder with last TSH in 2020.     Past Medical, Surgical, and Family History reviewed and updated in chart.     Reviewed all medications by prescribing practitioner or clinical pharmacist (such as prescriptions, OTCs, herbal therapies and supplements) and documented in the medical record.    Past Medical History  Past Medical History:   Diagnosis Date    Anxiety     Depression     Lactose intolerance, unspecified     Lactose intolerance    Localized swelling, mass and lump, unspecified upper limb 05/23/2022    Axillary lump    Pain in right lower leg 04/06/2022    Right calf pain    PCOS (polycystic ovarian syndrome)     Personal history of other diseases of the respiratory system     History of asthma       Social History  Social History     Tobacco Use    Smoking status: Never    Smokeless tobacco: Current    Tobacco comments:     Vapes   Vaping Use    Vaping status: Every Day    Substances: Nicotine    Devices: Disposable   Substance Use Topics    Alcohol use: Yes    Drug use: Never       Family History     Family History   Problem Relation Name Age of Onset    Other  "(diabetes mellitus due to underlying condition with hyperglycemia unspecified whether long term insulin use) Mother      Kidney cancer Mother      Other (tonsil cancer) Mother      Thyroid disease Mother      Other (mental problems) Mother      Other (factor V inhibitor disorder) Father      Hypertension Father      Thyroid disease Sister      Other (mental problems) Sister      Other (mental problems) Brother          Allergies:  Allergies   Allergen Reactions    Azithromycin Unknown    Keflex [Cephalexin] Hives        Outpatient Medications:  Current Outpatient Medications   Medication Instructions    albuterol (Ventolin HFA) 90 mcg/actuation inhaler 2 puffs, inhalation, Every 6 hours PRN    busPIRone (BUSPAR) 5 mg, oral, 3 times daily PRN    levonorgestreL-ethinyl estrad (Altavera, 28,) 0.15-0.03 mg tablet 1 tablet, oral, Daily    mupirocin (Bactroban) 2 % ointment APPLY A SMALL AMOUNT 3 TIMES DAILY AS DIRECTED    sertraline (ZOLOFT) 100 mg, oral, Daily          Labs:   CMP:  Recent Labs     05/08/24  1852 11/10/23  0701 03/10/23  1502 01/10/23  0909    136 139 138   K 4.1 3.5 3.7 3.8    102 106 104   CO2 25 27 27 25   ANIONGAP 12 11 10 13   BUN 11 10 18 11   CREATININE 0.81 0.79 0.78 0.79   EGFR >90 >90  --   --    MG 1.88  --   --   --      Recent Labs     05/08/24 1852 03/10/23  1502   ALBUMIN 4.5 4.3   ALKPHOS 63 73   ALT 19 12   AST 15 14   BILITOT 0.3 0.3   LIPASE 23 28     CBC:  Recent Labs     05/08/24  1852 11/10/23  0701 03/10/23  1502 01/10/23  0909 08/04/21  1240   WBC 8.4 7.3 8.4 6.9  --    HGB 14.7 15.1 13.7 15.0 14.7   HCT 43.7 43.4 41.6 44.7 43.3    214 241 273  --    MCV 82 85 88 86  --      COAG:   Recent Labs     05/08/24  1852   INR 1.0   DDIMERVTE 488     ABO:   Recent Labs     01/10/23  0909   ABO O     HEME/ENDO:  Recent Labs     10/23/20  1004   TSH 0.97      CARDIAC:   Recent Labs     05/08/24 2001 05/08/24  1852   TROPHS <3 <3   No results for input(s): \"CHOL\", " "\"LDLF\", \"HDL\", \"TRIG\" in the last 12374 hours.  MICRO: No results for input(s): \"ESR\", \"CRP\", \"PROCAL\" in the last 74251 hours.  No results found for the last 90 days.    Notable Studies: imaging personally reviewed   EKG:  Encounter Date: 05/08/24   ECG 12 Lead   Result Value    Ventricular Rate 85    Atrial Rate 85    NM Interval 114    QRS Duration 92    QT Interval 350    QTC Calculation(Bazett) 416    P Axis 62    R Axis 74    T Axis 16    QRS Count 14    Q Onset 215    P Onset 158    P Offset 204    T Offset 390    QTC Fredericia 393    Narrative    Normal sinus rhythm  Normal ECG  When compared with ECG of 30-APR-2022 17:46,  Previous ECG has undetermined rhythm, needs review  See ED provider note for full interpretation and clinical correlation  Confirmed by Roma Alicea ([id number]) on 5/11/2024 5:30:55 PM     Echocardiogram: No results found for this or any previous visit from the past 1825 days.    Stress Testing: No results found for this or any previous visit from the past 1825 days.    Cardiac Catheterization: No results found for this or any previous visit from the past 1825 days.  No results found for this or any previous visit from the past 3650 days.         REVIEW OF SYSTEMS  A 10-point system review was completed and was negative except as noted in the HPI.      VITALS  Vitals:    05/22/24 1348   BP: 120/84   Pulse: 98   SpO2: 98%       PHYSICAL EXAM  General: awake, alert and oriented. No acute distress.   Skin: Skin is warm, dry and intact without rashes or lesions.  HEENT: normocephalic, atraumatic; conjunctivae are clear without exudates or hemorrhage. Sclera is non-icteric. Eyelids are normal in appearance without swelling or lesions. Hearing intact. Nares are patent bilaterally. Moist mucous membranes.   Cardiovascular: heart rate and rhythm are normal. No murmurs, gallops, or rubs are auscultated. S1 and S2 are heard and are of normal intensity. No JVD, no carotid bruits  Respiratory: " bilateral lung sounds clear to auscultations without rales, rhonchi, or wheezes. No accessory muscle use or stridor  Gastrointestinal: non-distended, non-tender  Genitourinary: exam deferred  Musculoskeletal: ROM intact, no deformities  Extremities: pulses palpable bilaterally; no swelling or erythema  Neurological: no focal deficits; gait steady  Psychiatric: appropriate mood and affect; good judgment and insight          ASSESSMENT AND PLAN  Assessment/Plan   Diagnoses and all orders for this visit:    Palpitations  -Will check TSH to rule out as a causative factor into her palpitations  -Will order 7 day event monitor to rule out any underlying arrhythmias     Atypical chest pain  - Will order exercise stress test             RTC: after testing       Thank you for allowing me to participate in the care of this patient. Please reach me out if you have any questions or if you need any clarifications regarding the patient's care.    Tracey Weber, MELVINA, APRN, FNP-C  Division of Cardiovascular Medicine  [facility]  [facility]

## 2024-05-22 ENCOUNTER — OFFICE VISIT (OUTPATIENT)
Dept: CARDIOLOGY | Facility: CLINIC | Age: 24
End: 2024-05-22
Payer: COMMERCIAL

## 2024-05-22 ENCOUNTER — HOSPITAL ENCOUNTER (OUTPATIENT)
Dept: RADIOLOGY | Facility: HOSPITAL | Age: 24
Discharge: HOME | End: 2024-05-22
Payer: COMMERCIAL

## 2024-05-22 ENCOUNTER — PHARMACY VISIT (OUTPATIENT)
Dept: PHARMACY | Facility: CLINIC | Age: 24
End: 2024-05-22
Payer: MEDICARE

## 2024-05-22 VITALS
HEART RATE: 98 BPM | BODY MASS INDEX: 46.15 KG/M2 | DIASTOLIC BLOOD PRESSURE: 84 MMHG | SYSTOLIC BLOOD PRESSURE: 120 MMHG | OXYGEN SATURATION: 98 % | WEIGHT: 277 LBS | HEIGHT: 65 IN

## 2024-05-22 DIAGNOSIS — R00.2 PALPITATIONS: Primary | ICD-10-CM

## 2024-05-22 DIAGNOSIS — R07.89 ATYPICAL CHEST PAIN: ICD-10-CM

## 2024-05-22 DIAGNOSIS — R19.7 DIARRHEA, UNSPECIFIED TYPE: ICD-10-CM

## 2024-05-22 DIAGNOSIS — R10.11 RUQ PAIN: ICD-10-CM

## 2024-05-22 PROCEDURE — 76705 ECHO EXAM OF ABDOMEN: CPT

## 2024-05-22 PROCEDURE — 76705 ECHO EXAM OF ABDOMEN: CPT | Performed by: RADIOLOGY

## 2024-05-22 PROCEDURE — 99204 OFFICE O/P NEW MOD 45 MIN: CPT

## 2024-05-23 ENCOUNTER — TELEPHONE (OUTPATIENT)
Dept: PRIMARY CARE | Facility: CLINIC | Age: 24
End: 2024-05-23
Payer: COMMERCIAL

## 2024-05-23 DIAGNOSIS — R10.11 RUQ PAIN: Primary | ICD-10-CM

## 2024-05-23 NOTE — TELEPHONE ENCOUNTER
Append Comments   Seen    Hi Louise Santoro,     Your ultrasound shows gall stones. I will send you to the surgeon Dr. Thornton to discuss if taking your gallbladder out makes sense.  Please let me know if you have questions or concerns.     Yours in health,  Sabrina Davey MD   Written by Sabrina Davey MD on 5/23/2024  1:39 PM EDT  Seen by patient Louise Santoro on 5/23/2024  2:21 PM  PT NOTIFIED AND SHE SCHED HER OWN APPT WITH DR THORNTON FOR 6/6/24 AT 8:30

## 2024-06-05 ENCOUNTER — LAB (OUTPATIENT)
Dept: LAB | Facility: LAB | Age: 24
End: 2024-06-05
Payer: COMMERCIAL

## 2024-06-05 DIAGNOSIS — Z01.812 PRE-PROCEDURAL LABORATORY EXAMINATION: ICD-10-CM

## 2024-06-05 DIAGNOSIS — R00.2 PALPITATIONS: ICD-10-CM

## 2024-06-05 LAB — TSH SERPL-ACNC: 1.3 MIU/L (ref 0.44–3.98)

## 2024-06-05 PROCEDURE — 80048 BASIC METABOLIC PNL TOTAL CA: CPT

## 2024-06-05 PROCEDURE — 84443 ASSAY THYROID STIM HORMONE: CPT

## 2024-06-05 PROCEDURE — 36415 COLL VENOUS BLD VENIPUNCTURE: CPT

## 2024-06-05 NOTE — PROGRESS NOTES
General Surgery Consultation    Patient: Louise Santoro  : 2000  MRN: [id number]  Date of Consultation: 24    Referring  Primary Care Provider: Sabrina Davey MD    Chief Complaint: Upper abdominal pain    History of Present Illness: Louise Santoro is a 23 y.o. old female seen at the request of Dr. Davey for evaluation of gallstones.  Over the past 2 months she has had crampy upper abdominal pain that radiates to her right shoulder.  She has associated nausea and indigestion.  The symptoms were initially elicited by eating rich or heavy foods.  However, more recently almost all foods have been giving her some discomfort.  She denies any fever, yellowing of the skin or eyes or dark-colored urine associated with this.  She has no previous abdominal surgery.  Her BMI is 46.  On review of systems, she also reports having episodes of chest pain, feeling as though her heart is racing, and shortness of breath.  She denies anxiety at the time of the symptoms.  The timing of the symptoms does not correlate with her abdominal symptoms.    Medical History:  Obesity, BMI 46  Anxiety/depression  Lactose intolerant  PCOS  Asthma    Surgical History:  D&C  [place] tooth extraction  Tonsillectomy    Home Medications:  Prior to Admission medications    Medication Sig Start Date End Date Taking? Authorizing Provider   albuterol (Ventolin HFA) 90 mcg/actuation inhaler Inhale 2 puffs every 6 hours if needed for wheezing or shortness of breath. 24  Collins Reza PA-C   busPIRone (Buspar) 5 mg tablet Take 1 tablet (5 mg) by mouth 3 times a day as needed (anxiety). 24  Collins Reza PA-C   levonorgestreL-ethinyl estrad (Altavera, 28,) 0.15-0.03 mg tablet Take 1 tablet by mouth once daily. 24   James Molina MD   mupirocin (Bactroban) 2 % ointment APPLY A SMALL AMOUNT 3 TIMES DAILY AS DIRECTED 11/15/22   Historical Provider, MD   sertraline (Zoloft) 100 mg tablet Take 1 tablet (100 mg) by  "mouth once daily. 5/16/24 5/16/25  Sabrina Davey MD     Allergies:  Allergies   Allergen Reactions    Azithromycin Unknown    Keflex [Cephalexin] Hives     Family History:   Father and sister with factor V inhibitor disorder.  Mother and father with \"trouble with anesthesia\".  Maternal grandmother had gallstones.    Social History:  Non-smoker.  Vapes nicotine daily.  Alcohol use.  No drug use.  Works at the [employer] located inside of [place].    ROS:  Constitutional: + Weight gain  Cardiovascular: + Chest pain, palpitations, rapid heartbeat, shortness of breath that is unexplained, shortness of breath at night, dizziness  Respiratory: + Chronic cough, asthma/wheezing, pain with breathing  Gastrointestinal: + Abdominal pain, bloating, food sensitivities, difficulty with nausea/vomiting, heartburn/sour stomach  Genitourinary: no dark-colored urine  Musculoskeletal: + Back pain, neck pain  Integumentary: no jaundice  Neurological: + Headaches  Psychiatric: + Anxiety, depression, mood swings, difficulty sleeping, difficulty concentrating, obsessing, claustrophobic  Endocrine: no heat or cold intolerance  Heme/Lymph: no easy bruising or bleeding    Objective:  /70   Pulse 86   Ht 1.651 m (5' 5\")   Wt 127 kg (280 lb)   BMI 46.59 kg/m²     Physical Exam:  Constitutional: No acute distress, conversant, pleasant, accompanied by her mother  Neurologic: alert and oriented  Psych: appropriate affect  Ears, Nose, Mouth and Throat: mucus membranes moist  Pulmonary: No labored breathing  Cardiovascular: Regular rate  Abdomen: soft, non-distended, BMI 46, moderately tender in the left lower quadrant (patient reports secondary to PCOS), minimally tender in the epigastric region and upper quadrant, no surgical scars  Musculoskeletal: Moves all extremities, no edema  Skin: no jaundice    Labs:  Labs from 5/8/24 reviewed: LFT's wnl, WBC 8.4, Hgb 14.7    Imaging:  Abdominal ultrasound from 5/22/2024 " reviewed: Gallbladder is nondistended.  No wall thickening.  There are small, layering, gravel like stones.  No pericholecystic fluid or sonographic Galeas sign.  No biliary ductal dilation, CBD measures 3 mm.  Liver appears normal.    Assessment and Plan: Louise Santoro is a 23 y.o. old female with symptomatic cholelithiasis.  We discussed the risks and benefits of laparoscopic cholecystectomy.  We discussed the alternative of a low-fat biliary diet.  She is also scheduled for a Holter monitor and stress test in workup of her cardiac symptoms.  We discussed that I would not offer elective surgery until we have the results of those. She was interested in trying dietary changes prior to resorting to surgery.  We also discussed that while her BMI is not prohibitive, medical weight loss prior to elective surgery would be in her best interest.  She was in agreement with this plan.  If she decides she would want her gallbladder out at any point in the future, I would be happy to see her back and get her scheduled for that.    Janay Bahena MD  6/6/2024

## 2024-06-06 ENCOUNTER — TELEPHONE (OUTPATIENT)
Dept: CARDIOLOGY | Facility: CLINIC | Age: 24
End: 2024-06-06

## 2024-06-06 ENCOUNTER — HOSPITAL ENCOUNTER (OUTPATIENT)
Dept: CARDIOLOGY | Facility: HOSPITAL | Age: 24
Discharge: HOME | End: 2024-06-06
Payer: COMMERCIAL

## 2024-06-06 ENCOUNTER — OFFICE VISIT (OUTPATIENT)
Dept: PRIMARY CARE | Facility: CLINIC | Age: 24
End: 2024-06-06
Payer: COMMERCIAL

## 2024-06-06 ENCOUNTER — OFFICE VISIT (OUTPATIENT)
Dept: SURGERY | Facility: CLINIC | Age: 24
End: 2024-06-06
Payer: COMMERCIAL

## 2024-06-06 VITALS — DIASTOLIC BLOOD PRESSURE: 74 MMHG | HEART RATE: 100 BPM | SYSTOLIC BLOOD PRESSURE: 120 MMHG

## 2024-06-06 VITALS
SYSTOLIC BLOOD PRESSURE: 102 MMHG | BODY MASS INDEX: 46.65 KG/M2 | WEIGHT: 280 LBS | HEIGHT: 65 IN | HEART RATE: 86 BPM | DIASTOLIC BLOOD PRESSURE: 70 MMHG

## 2024-06-06 VITALS
BODY MASS INDEX: 46.69 KG/M2 | OXYGEN SATURATION: 98 % | DIASTOLIC BLOOD PRESSURE: 60 MMHG | HEART RATE: 108 BPM | WEIGHT: 280.2 LBS | SYSTOLIC BLOOD PRESSURE: 120 MMHG | HEIGHT: 65 IN

## 2024-06-06 DIAGNOSIS — R10.11 RUQ PAIN: ICD-10-CM

## 2024-06-06 DIAGNOSIS — E28.2 POLYCYSTIC OVARIES: Primary | ICD-10-CM

## 2024-06-06 DIAGNOSIS — R00.2 PALPITATIONS: ICD-10-CM

## 2024-06-06 DIAGNOSIS — R07.89 ATYPICAL CHEST PAIN: ICD-10-CM

## 2024-06-06 DIAGNOSIS — F33.42 RECURRENT MAJOR DEPRESSIVE DISORDER, IN FULL REMISSION (CMS-HCC): ICD-10-CM

## 2024-06-06 DIAGNOSIS — E66.01 CLASS 3 SEVERE OBESITY DUE TO EXCESS CALORIES WITHOUT SERIOUS COMORBIDITY WITH BODY MASS INDEX (BMI) OF 45.0 TO 49.9 IN ADULT (MULTI): ICD-10-CM

## 2024-06-06 DIAGNOSIS — F41.9 ANXIETY: ICD-10-CM

## 2024-06-06 DIAGNOSIS — R07.9 CHEST PAIN, UNSPECIFIED: ICD-10-CM

## 2024-06-06 PROBLEM — E66.813 CLASS 3 SEVERE OBESITY DUE TO EXCESS CALORIES WITHOUT SERIOUS COMORBIDITY WITH BODY MASS INDEX (BMI) OF 45.0 TO 49.9 IN ADULT: Status: ACTIVE | Noted: 2024-06-06

## 2024-06-06 LAB — BODY SURFACE AREA: 2.41 M2

## 2024-06-06 PROCEDURE — 3008F BODY MASS INDEX DOCD: CPT | Performed by: STUDENT IN AN ORGANIZED HEALTH CARE EDUCATION/TRAINING PROGRAM

## 2024-06-06 PROCEDURE — 99214 OFFICE O/P EST MOD 30 MIN: CPT | Performed by: STUDENT IN AN ORGANIZED HEALTH CARE EDUCATION/TRAINING PROGRAM

## 2024-06-06 PROCEDURE — 93017 CV STRESS TEST TRACING ONLY: CPT

## 2024-06-06 PROCEDURE — 99203 OFFICE O/P NEW LOW 30 MIN: CPT | Performed by: SURGERY

## 2024-06-06 PROCEDURE — 93018 CV STRESS TEST I&R ONLY: CPT | Performed by: STUDENT IN AN ORGANIZED HEALTH CARE EDUCATION/TRAINING PROGRAM

## 2024-06-06 PROCEDURE — RXMED WILLOW AMBULATORY MEDICATION CHARGE

## 2024-06-06 PROCEDURE — 93016 CV STRESS TEST SUPVJ ONLY: CPT | Performed by: STUDENT IN AN ORGANIZED HEALTH CARE EDUCATION/TRAINING PROGRAM

## 2024-06-06 PROCEDURE — 9420000001 HC RT PATIENT EDUCATION 5 MIN

## 2024-06-06 PROCEDURE — 93270 REMOTE 30 DAY ECG REV/REPORT: CPT

## 2024-06-06 RX ORDER — METFORMIN HYDROCHLORIDE 500 MG/1
1000 TABLET, EXTENDED RELEASE ORAL
Qty: 200 TABLET | Refills: 3 | Status: SHIPPED | OUTPATIENT
Start: 2024-06-06 | End: 2025-07-11

## 2024-06-06 ASSESSMENT — PATIENT HEALTH QUESTIONNAIRE - PHQ9
8. MOVING OR SPEAKING SO SLOWLY THAT OTHER PEOPLE COULD HAVE NOTICED. OR THE OPPOSITE, BEING SO FIGETY OR RESTLESS THAT YOU HAVE BEEN MOVING AROUND A LOT MORE THAN USUAL: NOT AT ALL
5. POOR APPETITE OR OVEREATING: NOT AT ALL
6. FEELING BAD ABOUT YOURSELF - OR THAT YOU ARE A FAILURE OR HAVE LET YOURSELF OR YOUR FAMILY DOWN: NOT AT ALL
10. IF YOU CHECKED OFF ANY PROBLEMS, HOW DIFFICULT HAVE THESE PROBLEMS MADE IT FOR YOU TO DO YOUR WORK, TAKE CARE OF THINGS AT HOME, OR GET ALONG WITH OTHER PEOPLE: SOMEWHAT DIFFICULT
7. TROUBLE CONCENTRATING ON THINGS, SUCH AS READING THE NEWSPAPER OR WATCHING TELEVISION: MORE THAN HALF THE DAYS
2. FEELING DOWN, DEPRESSED OR HOPELESS: SEVERAL DAYS
3. TROUBLE FALLING OR STAYING ASLEEP: MORE THAN HALF THE DAYS
4. FEELING TIRED OR HAVING LITTLE ENERGY: MORE THAN HALF THE DAYS
SUM OF ALL RESPONSES TO PHQ9 QUESTIONS 1 & 2: 1
1. LITTLE INTEREST OR PLEASURE IN DOING THINGS: NOT AT ALL
9. THOUGHTS THAT YOU WOULD BE BETTER OFF DEAD, OR OF HURTING YOURSELF: NOT AT ALL
SUM OF ALL RESPONSES TO PHQ QUESTIONS 1-9: 7

## 2024-06-06 ASSESSMENT — ANXIETY QUESTIONNAIRES
IF YOU CHECKED OFF ANY PROBLEMS ON THIS QUESTIONNAIRE, HOW DIFFICULT HAVE THESE PROBLEMS MADE IT FOR YOU TO DO YOUR WORK, TAKE CARE OF THINGS AT HOME, OR GET ALONG WITH OTHER PEOPLE: SOMEWHAT DIFFICULT
2. NOT BEING ABLE TO STOP OR CONTROL WORRYING: NOT AT ALL
5. BEING SO RESTLESS THAT IT IS HARD TO SIT STILL: SEVERAL DAYS
7. FEELING AFRAID AS IF SOMETHING AWFUL MIGHT HAPPEN: NOT AT ALL
6. BECOMING EASILY ANNOYED OR IRRITABLE: SEVERAL DAYS
4. TROUBLE RELAXING: SEVERAL DAYS
3. WORRYING TOO MUCH ABOUT DIFFERENT THINGS: SEVERAL DAYS
GAD7 TOTAL SCORE: 4
1. FEELING NERVOUS, ANXIOUS, OR ON EDGE: NOT AT ALL

## 2024-06-06 NOTE — PROGRESS NOTES
Subjective:  Louise Santoro is a 23 y.o. female who presents to clinic today for Follow-up (1 MO FU MOOD, GALLBLADDER)      She notes that she has been trying really hard over the last year to help lose weight. She has eliminated fast food, she's elminated sugary coffee. She is going to the gym. She notes that she doesn't feel like she's currently her healthiest self.    She notes that she doesn't feel good when she is exercising and her joints hurt.    Review of Systems    Assessment/Plan:  Louise Santoro is a 23 y.o. female with a history of PCOS, Obesity, anxiety, depression who presents to clinic today to address the following issues:   1. Polycystic ovaries  metFORMIN XR (Glucophage-XR) 500 mg 24 hr tablet      2. Class 3 severe obesity due to excess calories without serious comorbidity with body mass index (BMI) of 45.0 to 49.9 in adult (Multi)  metFORMIN XR (Glucophage-XR) 500 mg 24 hr tablet      3. Anxiety        4. Recurrent major depressive disorder, in full remission (CMS-HCC)          With Louise's PCOS and her severe obesity and strong desire to lose weight we are going to trial metformin with the goal of titrating up to max dose of 2000mg daily. Additionally, discussed lifestyle modifications to improve their chronic condition. If no improvement with metformin I would recommend that she consider other options such as GLP1 therapy or bariatric surgery.    Problem List Items Addressed This Visit       Depression, major, recurrent (CMS-HCC)    Overview     She notes that depression has improved since increasing zoloft to 100mg daily.         Current Assessment & Plan     Chronic, known to provider, continue zoloft 100mg daily         Polycystic ovaries - Primary    Relevant Medications    metFORMIN XR (Glucophage-XR) 500 mg 24 hr tablet    Anxiety    Overview     She notes that her compulsive symptoms/anxiety symptoms have been worse lately. It has been spiking more frequently than  "previously.         Current Assessment & Plan     Recommended therapy and resources were provided         Class 3 severe obesity due to excess calories without serious comorbidity with body mass index (BMI) of 45.0 to 49.9 in adult (Multi)    Relevant Medications    metFORMIN XR (Glucophage-XR) 500 mg 24 hr tablet       Patient Instructions   Metformin:  Start taking 500mg daily with your largest meal. If you have side effects stay with this dose until those subside (1-2 weeks).  Increase to 500mg twice a day. If you have side effects stay with this dose until those subside (1-2 weeks).  Increase to 1000mg with your largest day and 500 meal the opposite time of day. If you have side effects stay with this dose until those subside (1-2 weeks).  Increase to 1000mg twice daily.      Therapy Options:  [facility], search by insurance and area  [facility] at [phone]    [facility]  [address] Floor, [facility]  [phone]    [place] [address]  [phone]  Emergency Lines:    24-Hour Crisis: [phone]  Rape Crisis/Domestic Violence: [phone]  Crisis Text Line: Text “[facility]OPE” to [id number]      Follow up: 6 weeks    Return precautions discussed.  An After Visit Summary was given to the patient.  All questions were answered and patient in agreement with plan.    Objective:  /60   Pulse 108   Ht 1.651 m (5' 5\")   Wt 127 kg (280 lb 3.2 oz)   SpO2 98%   BMI 46.63 kg/m²     Physical Exam  Vitals and nursing note reviewed.   Constitutional:       General: She is not in acute distress.     Appearance: Normal appearance. She is obese.   HENT:      Head: Normocephalic and atraumatic.      Mouth/Throat:      Mouth: Mucous membranes are moist.   Eyes:      General: No scleral icterus.        Right eye: No discharge.         Left eye: No discharge.      Extraocular Movements: Extraocular " movements intact.      Conjunctiva/sclera: Conjunctivae normal.   Cardiovascular:      Rate and Rhythm: Normal rate and regular rhythm.   Pulmonary:      Effort: Pulmonary effort is normal. No respiratory distress.      Breath sounds: Normal breath sounds.   Skin:     General: Skin is warm and dry.   Neurological:      General: No focal deficit present.      Mental Status: She is alert and oriented to person, place, and time.   Psychiatric:         Attention and Perception: Attention normal.         Mood and Affect: Mood normal.         Speech: Speech normal.         Behavior: Behavior normal.         Cognition and Memory: Cognition and memory normal.         Judgment: Judgment normal.         I spent 20 minutes in total time for this visit including all related clinical activities before, during, and after the visit excluding other billable activities/procedure time.     Sabrina Davey MD

## 2024-06-06 NOTE — PATIENT INSTRUCTIONS
Metformin:  Start taking 500mg daily with your largest meal. If you have side effects stay with this dose until those subside (1-2 weeks).  Increase to 500mg twice a day. If you have side effects stay with this dose until those subside (1-2 weeks).  Increase to 1000mg with your largest day and 500 meal the opposite time of day. If you have side effects stay with this dose until those subside (1-2 weeks).  Increase to 1000mg twice daily.      Therapy Options:  [facility], search by insurance and area  [facility] at [phone]    [facility]  [address] Floor, [facility] Center  [phone]    [place] [id number]  [phone]  Emergency Lines:    24-Hour Crisis: [phone]  Rape Crisis/Domestic Violence: [phone]  Crisis Text Line: Text “4HOPE” to [id number]

## 2024-06-06 NOTE — TELEPHONE ENCOUNTER
PT NOTIFIED.    ----- Message from KENROY Hull-CNP, DNP sent at 6/6/2024  8:25 AM EDT -----  Please let Louise know her thyroid function test was normal

## 2024-06-06 NOTE — LETTER
2024     Sabrina Davey MD  [facility] [address]    Patient: Louise Santoro   YOB: 2000   Date of Visit: 2024       Dear Dr. Sabrina Davey MD:    Thank you for referring Louise Santoro to me for evaluation. Below are my notes for this consultation.  If you have questions, please do not hesitate to call me. I look forward to following your patient along with you.       Sincerely,     Janay Bahena MD      CC: No Recipients  ______________________________________________________________________________________    General Surgery Consultation    Patient: Louise Santoro  : 2000  MRN: [id number]  Date of Consultation: 24    Referring  Primary Care Provider: Sabrina Davey MD    Chief Complaint: Upper abdominal pain    History of Present Illness: Louise Santoro is a 23 y.o. old female seen at the request of Dr. Davey for evaluation of gallstones.  Over the past 2 months she has had crampy upper abdominal pain that radiates to her right shoulder.  She has associated nausea and indigestion.  The symptoms were initially elicited by eating rich or heavy foods.  However, more recently almost all foods have been giving her some discomfort.  She denies any fever, yellowing of the skin or eyes or dark-colored urine associated with this.  She has no previous abdominal surgery.  Her BMI is 46.  On review of systems, she also reports having episodes of chest pain, feeling as though her heart is racing, and shortness of breath.  She denies anxiety at the time of the symptoms.  The timing of the symptoms does not correlate with her abdominal symptoms.    Medical History:  Obesity, BMI 46  Anxiety/depression  Lactose intolerant  PCOS  Asthma    Surgical History:  D&C  [place] tooth extraction  Tonsillectomy    Home Medications:  Prior to Admission medications    Medication Sig Start Date End Date Taking? Authorizing Provider   albuterol (Ventolin HFA) 90 mcg/actuation inhaler  "Inhale 2 puffs every 6 hours if needed for wheezing or shortness of breath. 1/23/24 1/22/25  Collins Reza PA-C   busPIRone (Buspar) 5 mg tablet Take 1 tablet (5 mg) by mouth 3 times a day as needed (anxiety). 1/23/24 1/22/25  Collins Reza PA-C   levonorgestreL-ethinyl estrad (Altavera, 28,) 0.15-0.03 mg tablet Take 1 tablet by mouth once daily. 4/1/24   James Molina MD   mupirocin (Bactroban) 2 % ointment APPLY A SMALL AMOUNT 3 TIMES DAILY AS DIRECTED 11/15/22   Historical Provider, MD   sertraline (Zoloft) 100 mg tablet Take 1 tablet (100 mg) by mouth once daily. 5/16/24 5/16/25  Sabrina Davey MD     Allergies:  Allergies   Allergen Reactions   • Azithromycin Unknown   • Keflex [Cephalexin] Hives     Family History:   Father and sister with factor V inhibitor disorder.  Mother and father with \"trouble with anesthesia\".  Maternal grandmother had gallstones.    Social History:  Non-smoker.  Vapes nicotine daily.  Alcohol use.  No drug use.  Works at the [employer] located inside of [employer].    ROS:  Constitutional: + Weight gain  Cardiovascular: + Chest pain, palpitations, rapid heartbeat, shortness of breath that is unexplained, shortness of breath at night, dizziness  Respiratory: + Chronic cough, asthma/wheezing, pain with breathing  Gastrointestinal: + Abdominal pain, bloating, food sensitivities, difficulty with nausea/vomiting, heartburn/sour stomach  Genitourinary: no dark-colored urine  Musculoskeletal: + Back pain, neck pain  Integumentary: no jaundice  Neurological: + Headaches  Psychiatric: + Anxiety, depression, mood swings, difficulty sleeping, difficulty concentrating, obsessing, claustrophobic  Endocrine: no heat or cold intolerance  Heme/Lymph: no easy bruising or bleeding    Objective:  /70   Pulse 86   Ht 1.651 m (5' 5\")   Wt 127 kg (280 lb)   BMI 46.59 kg/m²     Physical Exam:  Constitutional: No acute distress, conversant, pleasant, accompanied by her " mother  Neurologic: alert and oriented  Psych: appropriate affect  Ears, Nose, Mouth and Throat: mucus membranes moist  Pulmonary: No labored breathing  Cardiovascular: Regular rate  Abdomen: soft, non-distended, BMI 46, moderately tender in the left lower quadrant (patient reports secondary to PCOS), minimally tender in the epigastric region and upper quadrant, no surgical scars  Musculoskeletal: Moves all extremities, no edema  Skin: no jaundice    Labs:  Labs from 5/8/24 reviewed: LFT's wnl, WBC 8.4, Hgb 14.7    Imaging:  Abdominal ultrasound from 5/22/2024 reviewed: Gallbladder is nondistended.  No wall thickening.  There are small, layering, gravel like stones.  No pericholecystic fluid or sonographic Galeas sign.  No biliary ductal dilation, CBD measures 3 mm.  Liver appears normal.    Assessment and Plan: Louise Santoro is a 23 y.o. old female with symptomatic cholelithiasis.  We discussed the risks and benefits of laparoscopic cholecystectomy.  We discussed the alternative of a low-fat biliary diet.  She is also scheduled for a Holter monitor and stress test in workup of her cardiac symptoms.  We discussed that I would not offer elective surgery until we have the results of those. She was interested in trying dietary changes prior to resorting to surgery.  We also discussed that while her BMI is not prohibitive, medical weight loss prior to elective surgery would be in her best interest.  She was in agreement with this plan.  If she decides she would want her gallbladder out at any point in the future, I would be happy to see her back and get her scheduled for that.    Janay Bahena MD  6/6/2024

## 2024-06-07 ENCOUNTER — PHARMACY VISIT (OUTPATIENT)
Dept: PHARMACY | Facility: CLINIC | Age: 24
End: 2024-06-07
Payer: MEDICARE

## 2024-06-07 DIAGNOSIS — R07.89 ATYPICAL CHEST PAIN: Primary | ICD-10-CM

## 2024-06-07 DIAGNOSIS — Z01.812 PRE-PROCEDURAL LABORATORY EXAMINATION: ICD-10-CM

## 2024-06-07 LAB
ANION GAP SERPL CALC-SCNC: 13 MMOL/L (ref 10–20)
BUN SERPL-MCNC: 12 MG/DL (ref 6–23)
CALCIUM SERPL-MCNC: 9.3 MG/DL (ref 8.6–10.3)
CHLORIDE SERPL-SCNC: 102 MMOL/L (ref 98–107)
CO2 SERPL-SCNC: 26 MMOL/L (ref 21–32)
CREAT SERPL-MCNC: 0.9 MG/DL (ref 0.5–1.05)
EGFRCR SERPLBLD CKD-EPI 2021: >90 ML/MIN/1.73M*2
GLUCOSE SERPL-MCNC: 91 MG/DL (ref 74–99)
POTASSIUM SERPL-SCNC: 3.8 MMOL/L (ref 3.5–5.3)
SODIUM SERPL-SCNC: 137 MMOL/L (ref 136–145)

## 2024-06-07 RX ORDER — METOPROLOL TARTRATE 50 MG/1
50 TABLET ORAL 2 TIMES DAILY
Qty: 3 TABLET | Refills: 0 | Status: CANCELLED | OUTPATIENT
Start: 2024-06-07 | End: 2024-06-09

## 2024-06-12 ENCOUNTER — TELEPHONE (OUTPATIENT)
Dept: CARDIOLOGY | Facility: CLINIC | Age: 24
End: 2024-06-12
Payer: COMMERCIAL

## 2024-06-12 NOTE — TELEPHONE ENCOUNTER
----- Message from Sarah E Markley, LPN sent at 6/11/2024  3:07 PM EDT -----  Received call from - CT Angio scheduled for 6/19/24 at 11:00. Pre-cert submitted and approved #[id number].  ----- Message -----  From: Destini Kauffman CMA  Sent: 6/7/2024   1:40 PM EDT  To: Do Joe Jeffery Clinical Support Staff    Left a message with  to call the office back.  ----- Message -----  From: Destini Kauffman CMA  Sent: 6/7/2024   1:39 PM EDT  To: Do Joe Jeffery Clinical Support Staff      ----- Message -----  From: Destini Kauffman CMA  Sent: 6/7/2024  12:58 PM EDT  To: Destini Kauffman CMA    Left a message for  to call the office back.   ----- Message -----  From: BEATRIZ Hull DNP  Sent: 6/7/2024  10:09 AM EDT  To: Destini Kauffman CMA    Ok, order placed.   She does not need blood work to check kidney function but does need to stop by any  lab and do a urine pregnancy test.   Please confirm what pharmacy she uses and I will send the 3 doses of metoprolol. She is to take 2 tablets the night before the exam and 1 the morning of to ensure her resting heart is around 60 bpm to allow for accurate imaging.   ----- Message -----  From: Destini Kauffman CMA  Sent: 6/7/2024  10:01 AM EDT  To: BEATRIZ Hull DNP; #    Pt has been notified and  agrees to have the testing done.   ----- Message -----  From: Destini Kauffman CMA  Sent: 6/7/2024   9:58 AM EDT  To: Do Joe Jeffery Clinical Support Staff    Left a message for the pt to call the office back.   ----- Message -----  From: BEATRIZ Hull DNP  Sent: 6/6/2024   2:29 PM EDT  To: Do [id number] Card1 Clinical Support Staff    Please let Louise know her stress test was normal but had to be stopped early due to chest discomfort. Her exercise capacity is below average for her age. I recommend a coronary CTA to have a better look at the blood flow moving through the coronary arteries to make sure there is no abnormalities given  the stress test had to be stopped early. If agreeable, I will place the order.

## 2024-06-12 NOTE — TELEPHONE ENCOUNTER
Pt notified of CT angio on 6/19/24 at 11:00, arrive at 10:00am. Reviewed pregnancy urine test needed, avoid caffeine on day before and day of procedure, clear liquids only 3 hours prior to test, reviewed Metoprolol and confirmed pharmacy at [facility]. Pt voiced understanding.

## 2024-06-17 ENCOUNTER — LAB (OUTPATIENT)
Dept: LAB | Facility: LAB | Age: 24
End: 2024-06-17
Payer: COMMERCIAL

## 2024-06-17 ENCOUNTER — PHARMACY VISIT (OUTPATIENT)
Dept: PHARMACY | Facility: CLINIC | Age: 24
End: 2024-06-17
Payer: MEDICARE

## 2024-06-17 ENCOUNTER — OFFICE VISIT (OUTPATIENT)
Dept: HEMATOLOGY/ONCOLOGY | Facility: CLINIC | Age: 24
End: 2024-06-17
Payer: COMMERCIAL

## 2024-06-17 VITALS
TEMPERATURE: 97.7 F | RESPIRATION RATE: 20 BRPM | OXYGEN SATURATION: 98 % | HEART RATE: 95 BPM | BODY MASS INDEX: 45.71 KG/M2 | DIASTOLIC BLOOD PRESSURE: 84 MMHG | WEIGHT: 284.4 LBS | HEIGHT: 66 IN | SYSTOLIC BLOOD PRESSURE: 121 MMHG

## 2024-06-17 DIAGNOSIS — Z83.2 FAMILY HISTORY OF FACTOR V DEFICIENCY: ICD-10-CM

## 2024-06-17 DIAGNOSIS — Z01.812 PRE-PROCEDURAL LABORATORY EXAMINATION: ICD-10-CM

## 2024-06-17 LAB
APTT PPP: 35 SECONDS (ref 27–38)
HCG UR QL IA.RAPID: NEGATIVE
INR PPP: 1 (ref 0.9–1.1)
PROTHROMBIN TIME: 11.1 SECONDS (ref 9.8–12.8)

## 2024-06-17 PROCEDURE — 85246 CLOT FACTOR VIII VW ANTIGEN: CPT

## 2024-06-17 PROCEDURE — 81025 URINE PREGNANCY TEST: CPT

## 2024-06-17 PROCEDURE — 99214 OFFICE O/P EST MOD 30 MIN: CPT | Performed by: INTERNAL MEDICINE

## 2024-06-17 PROCEDURE — 36415 COLL VENOUS BLD VENIPUNCTURE: CPT

## 2024-06-17 PROCEDURE — 81241 F5 GENE: CPT

## 2024-06-17 PROCEDURE — 3008F BODY MASS INDEX DOCD: CPT | Performed by: INTERNAL MEDICINE

## 2024-06-17 PROCEDURE — RXMED WILLOW AMBULATORY MEDICATION CHARGE

## 2024-06-17 PROCEDURE — 85730 THROMBOPLASTIN TIME PARTIAL: CPT

## 2024-06-17 PROCEDURE — G0452 MOLECULAR PATHOLOGY INTERPR: HCPCS | Performed by: INTERNAL MEDICINE

## 2024-06-17 PROCEDURE — 85610 PROTHROMBIN TIME: CPT

## 2024-06-17 ASSESSMENT — PATIENT HEALTH QUESTIONNAIRE - PHQ9
2. FEELING DOWN, DEPRESSED OR HOPELESS: NOT AT ALL
SUM OF ALL RESPONSES TO PHQ9 QUESTIONS 1 AND 2: 0
1. LITTLE INTEREST OR PLEASURE IN DOING THINGS: NOT AT ALL

## 2024-06-17 ASSESSMENT — PAIN SCALES - GENERAL: PAINLEVEL: 0-NO PAIN

## 2024-06-17 ASSESSMENT — ENCOUNTER SYMPTOMS
DEPRESSION: 0
LOSS OF SENSATION IN FEET: 0
OCCASIONAL FEELINGS OF UNSTEADINESS: 0

## 2024-06-17 NOTE — PATIENT INSTRUCTIONS
Reviewed labs and recent medical history.  Discussed her family history. Reviewed that her Father has Factor 5 Leiden.  Will plan to check her labs to determine if she carries this mutation. Orders placed.  Discussed her other symptoms and will check for Von Willebrand's. Orders placed.  Dr. Rodriguez will call patient with results.  She does not need a return appt at this time.. Encouraged her to all with questions or concerns.

## 2024-06-17 NOTE — PROGRESS NOTES
Patient ID:Louise Santoro is a 23 y.o. year old female patient with family history of factor V Leiden mutation       Referring Physician: Yohana Rodriguez MD  [address] [place] [address] [address]  [place] [id number]  Primary Care Provider: Sabrina Davey MD    Chief Complaint  Chief Complaint   Patient presents with    factor 5 family history          History of the Present Illness  2022. Evaluation for DVT of right leg. Noted negative for thrombosis.    3/2023. Diagnosed with polycystic ovaries.    2023.  Dilation and curettage for missed .    She is here because her father has factor V Leiden mutation.  He had undergone a rotator cuff surgery and got a clot postoperatively and was treated with blood thinners.  A year later had blood clot develop from his knee to his groin.   He told her to get tested.    She has had no blood clotting problems.  She said that she has tonsillectomy and had no abnormal bleeding.  She had her wisdom teeth excised and had no significant bleeding.  She said that she was under anesthesia that she could feel the whole procedure and she was in a lot of pain.  She has had no abnormal bleeding but has had heavy menses.  She says that she had 2 miscarriages back-to-back in the past year.  She is 1 of 5 siblings.  She says that 2 of them have been tested and 1 tested positive.  The others have not been tested.    She has polycystic ovarian syndrome.  She says that she has recently been treated with metformin.  She has been trying to increase the dose but it gives her diarrhea.  She was diagnosed at age 19.  She had irregular periods with crampy abdominal pain.  She had an ovarian cyst rupture.  At the time he thought it was appendicitis.  Further testing showed that she had PCOS.  She also notes that they tested her for estrogen and testosterone.  She says that she has not had hirsutism.    Chart review shows that she has had multiple emergency department visits.    Monitoring  Parameters  History and physical examination and factor V Leiden mutation analysis.    Review of Systems - Oncology   Review of Systems   Constitutional:  Positive for fatigue. Negative for appetite change and unexpected weight change.   HENT:  Negative for dental problem, hearing loss, rhinorrhea and trouble swallowing.    Eyes:  Negative for visual disturbance.   Respiratory:  Negative for cough and shortness of breath.    Cardiovascular:  Negative for palpitations and leg swelling.   Gastrointestinal:  Negative for abdominal distention, constipation, diarrhea, nausea and vomiting.   Endocrine: Negative for polyuria.   Genitourinary:  Positive for vaginal bleeding. Negative for dysuria, frequency, urgency and vaginal discharge.   Musculoskeletal:  Negative for arthralgias and myalgias.   Skin:  Negative for pallor and rash.   Neurological:  Positive for headaches. Negative for weakness, light-headedness and numbness.   Psychiatric/Behavioral:  Negative for self-injury, sleep disturbance and suicidal ideas. The patient is not nervous/anxious.    She says that she has chest pain and palpitations from time to time and it correlates with dizziness.  She has some shortness of breath occasionally with an aching in her chest.  She is currently wearing an event monitor.  She has been obese.  She is looking into bariatric treatment.  She says her bowels move well.    Past Medical History  Past Medical History:   Diagnosis Date    Anxiety     Ast[facility]     Depression     Lactose intolerance, unspecified     Lactose intolerance    Localized swelling, mass and lump, unspecified upper limb 05/23/2022    Axillary lump    Pain in right lower leg 04/06/2022    Right calf pain    PCOS (polycystic ovarian syndrome)     Personal history of other diseases of the respiratory system     History of asthma        Surgical History  Past Surgical History:   Procedure Laterality Date    DILATION AND CURETTAGE OF UTERUS  2023    OTHER  "SURGICAL HISTORY  04/13/2021    [place] tooth extraction    OTHER SURGICAL HISTORY  04/06/2022    Tonsillectomy       Social History  Social History     Tobacco Use    Smoking status: Never    Smokeless tobacco: Current    Tobacco comments:     Vapes   Vaping Use    Vaping status: Every Day    Substances: Nicotine    Devices: Disposable   Substance Use Topics    Alcohol use: Yes    Drug use: Never        Family History  family history includes Anesthesia problems in her mother and sister; Blood Disorder in her father and sister; Hypertension in her father and mother; Kidney cancer in her mother; Thyroid disease in her mother and sister; diabetes mellitus due to underlying condition with hyperglycemia unspecified whether long term insulin use in her mother; factor V inhibitor disorder in her father; mental problems in her brother, mother, and sister; tonsil cancer in her mother.       Current Medications  Current Outpatient Medications   Medication Instructions    albuterol (Ventolin HFA) 90 mcg/actuation inhaler 2 puffs, inhalation, Every 6 hours PRN    busPIRone (BUSPAR) 5 mg, oral, 3 times daily PRN    levonorgestreL-ethinyl estrad (Altavera, 28,) 0.15-0.03 mg tablet 1 tablet, oral, Daily    metFORMIN XR (GLUCOPHAGE-XR) 1,000 mg, oral, 2 times daily (morning and late afternoon), Do not crush, chew, or split.    sertraline (ZOLOFT) 100 mg, oral, Daily           OARRS Review  I have personally reviewed the OARRS report for Louise Santoro. I have considered the risks of abuse, dependence, addiction and diversion.  Evaluation.  It shows that she has no repetitive stimulants or opioids.    Vital Signs  /84   Pulse 95   Temp 36.5 °C (97.7 °F)   Resp 20   Ht (S) 1.678 m (5' 6.06\")   Wt 129 kg (284 lb 6.4 oz)   SpO2 98%   BMI 45.82 kg/m²      Physical Exam  Vitals and nursing note reviewed. Exam conducted with a chaperone present.   Constitutional:       General: She is not in acute distress.     Appearance: " She is not ill-appearing or toxic-appearing.      Comments: She is a well-developed well-nourished obese white female who is in no acute distress.  She has multiple nose rings and many tattoos.  She is pleasant and engaged.  She does not appear to be acutely or chronically ill.   HENT:      Head: Normocephalic and atraumatic.      Nose: Nose normal.      Mouth/Throat:      Mouth: Mucous membranes are moist.      Pharynx: Oropharynx is clear. No oropharyngeal exudate or posterior oropharyngeal erythema.   Eyes:      General: No scleral icterus.     Extraocular Movements: Extraocular movements intact.      Conjunctiva/sclera: Conjunctivae normal.      Pupils: Pupils are equal, round, and reactive to light.   Neck:      Comments: There is no lymphadenopathy in the head and neck region, supraclavicular axillary or inguinal regions bilaterally.  Cardiovascular:      Rate and Rhythm: Regular rhythm. Tachycardia present.      Heart sounds: Normal heart sounds. No murmur heard.  Pulmonary:      Effort: Pulmonary effort is normal. No respiratory distress.      Breath sounds: Normal breath sounds. No wheezing, rhonchi or rales.   Abdominal:      General: There is no distension.      Palpations: Abdomen is soft. There is no mass.      Tenderness: There is no abdominal tenderness. There is no guarding or rebound.      Comments: Large nontender abdomen   Musculoskeletal:         General: Normal range of motion.      Cervical back: Normal range of motion. No tenderness.      Right lower leg: No edema.      Left lower leg: No edema.   Lymphadenopathy:      Cervical: No cervical adenopathy.   Skin:     General: Skin is warm and dry.      Coloration: Skin is not jaundiced or pale.      Findings: No bruising.   Neurological:      General: No focal deficit present.      Mental Status: She is alert and oriented to person, place, and time. Mental status is at baseline.      Cranial Nerves: No cranial nerve deficit.      Motor: No  weakness.      Gait: Gait normal.   Psychiatric:         Mood and Affect: Mood normal.         Behavior: Behavior normal.         Thought Content: Thought content normal.         Judgment: Judgment normal.      Comments: She is alert, pleasant and engaged.        Current Laboratory Data  No results found for this or any previous visit (from the past 168 hour(s)).  Previous laboratory data showed a normal CBC with a minor elevation of erythrocytes at 5.32 with a normal hemoglobin and hematocrit.  She had normal red blood cell indices.  He has a normal white blood cell differential.  Serum chemistries were also normal.      Recent Imaging  Stress Test    Result Date: 6/6/2024             [place] [address] Phone [phone], Fax [phone] Exercise Stress Test Patient Name:      PATRICIO JONES       Ordering Provider:     [id number] STEPHEN SANCHEZ Study Date:        6/6/2024             Reading Physician:     Panda Norton MD MRN/PID:           [id number]             Supervising Physician: Panda Norton MD Accession#:        [id number]         Fellow: Date of Birth/Age: 2000 / 23 years Fellow: Gender:            F                    Nurse:                 N/A Admit Date:        6/6/2024             Technician:            Pito Jung RRT,                                                                CCT Admission Status:  Outpatient           Sonographer:           N/A Height:            165.10 cm            Technologist: Weight:            125.65 kg            Additional Staff: BSA:               2.27 m2              Encounter#:            [id number] BMI:               46.10 kg/m2          Patient Location:      [facility] Stress Lab Study Type:    STRESS TEST  ONLY Diagnosis/ICD: Chest pain, unspecified-R07.9 Indication:    Chest Pain Falls Risk: Low: Patient has low risk for sustaining a fall; environmental safety interventions in place.  Study Details: Correct procedure and correct patient verified verbally and with                ID Band checked.  Patient History: Chest pain, dyspnea and palpitations. Allergies: Azithromycin, Keflex. Smoker:    Current. Diabetes:  No. BMI:       Obese >30.  Medications: Zoloft, Buspsar, albuterol. The patient did not take medications as prescribed.  Patient Performance: The patient exercised to stage II on a Cam protocol for 6 minutes and 00 seconds, achieving 7.00 METS. The peak heart rate achieved was 193 bpm, which was 97 % of the age predicted target heart rate of 199 bpm. The resting blood pressure was 122/87 mmHg with a heart rate of 99 bpm. The standing blood pressure was 120/74 mmHg with a heart rate of 100 bpm. The patient developed atypical chest pain during the stress exam. The symptoms resolved with rest 6 minutes into recovery. The blood pressure response was hypertensive. The test was terminated due to: chest pain - non-cardiac and achieved age predicted maximum heart rate.  Baseline ECG: Resting ECG showed Sinus Arrhythmia.  Stress ECG: Stress ECG showed sinus tachycardia.  Stress Stage Data: +-----------------+---+------+-------+                  HR Sys BPDias BP +-----------------+---+------+-------+ Baseline Resting 99 122   87      +-----------------+---+------+-------+ Baseline [id number]   74      +-----------------+---+------+-------+ Stage I          [id number]   80      +-----------------+---+------+-------+ Stage II         [id number]   95      +-----------------+---+------+-------+  Recovery ECG: Recovery ECG showed sinus tachycardia. The heart rate recovery was normal.  +-----------+---+------+-------+            HR Sys BPDias BP +-----------+---+------+-------+ Recovery I 162               +-----------+---+------+-------+ Recovery [id number]   84      +-----------+---+------+-------+ Recovery [id number]   82      +-----------+---+------+-------+ Recovery [id number]   82      +-----------+---+------+-------+  Summary:  1. Baseline EKG shows sinus tachycardia with no resting ST-T segment changes.  2. Patient exercised for 6 minutes achieving 7.0 METS. Exercise capacity is below average for age.  3. Heart rate response to exercise normal. Blood pressure response to exercise normal.  4. With exercise no ST-T segment changes suggestive ischemia or sustained ventricular arrhythmias were seen.  5. Patient did have chest discomfort (limiting) with exercise resulting in stopping the stress test. Chest tightness improved in recovery.  6. No electrocardiographic changes consistent with ischemia; as noted above patient did have chest discomfort with exercise.  7. Szymanski treadmill score -2 (moderate risk).  8. Adequate level of stress achieved. [id number] Wilton Norton MD Electronically signed on 6/6/2024 at 1:46:22 PM   ** Final **     US right upper quadrant    Result Date: 5/23/2024  Interpreted By:  Tara Griffin, STUDY: US RIGHT UPPER QUADRANT;  5/22/2024 9:04 am   INDICATION: Signs/Symptoms:23 year old female with RUQ Pain, please evaluate gallbladder.   COMPARISON: None.   ACCESSION NUMBER(S): [id number]   ORDERING CLINICIAN: KING ROGERS   TECHNIQUE: Multiple images of the abdomen were obtained.   FINDINGS: LIVER: The echogenicity of the liver is within normal limits. There is no hepatic mass. The sagittal dimension of the right lobe of the liver is 15.4 cm, nonenlarged   GALLBLADDER: The gallbladder is nondistended. The gallbladder wall is not thickened. There are small layering gravel-like gallstones There is no sludge. There is no pericholecystic fluid. There is no sonographic Galeas's sign.   BILE DUCTS: There is no intrahepatic biliary dilatation. The common bile duct is   nondilated measuring 0.3 cm.   PANCREAS: The pancreas is unremarkable.   RIGHT KIDNEY: The right kidney is  normal in size measuring 10.6 cm in length.   The echogenicity of the cortex is within normal limits. There is no renal mass. There is no intrarenal calculus or hydronephrosis.       Cholelithiasis with no evidence for acute cholecystitis.   MACRO: None   Signed by: Tara Griffin 5/23/2024 11:00 AM Dictation workstation:   [id number]        Pathology  Family history for factor V Leiden mutation      Performance Status:  Asymptomatic    Assessment/Plan    1.  Family history for factor V Leiden mutation.  We will do mutation analysis.  She has had no personal history of clotting.  Her father had tested positive as has one of her siblings.    2.  Heavy menses and multiple miscarriages.  We will also check her for von Willebrand's disease.    3.  Multiple comorbidities.  These include but are not limited to:  Polycystic ovarian syndrome  Obesity    We will call her with the results of this evaluation.  Return appointment was not made for her at this time.  We reviewed the glad to see her if the need arises.      Yohana Rodriguez MD

## 2024-06-17 NOTE — PROGRESS NOTES
Pt going to hosp lab after visit today-Dr Rodriguez will call pt with results  No follow up at this time  Reviewed AVS with patient- patient verbalizes understanding

## 2024-06-18 ENCOUNTER — PHARMACY VISIT (OUTPATIENT)
Dept: PHARMACY | Facility: CLINIC | Age: 24
End: 2024-06-18
Payer: MEDICARE

## 2024-06-18 ENCOUNTER — TELEPHONE (OUTPATIENT)
Dept: CARDIOLOGY | Facility: CLINIC | Age: 24
End: 2024-06-18
Payer: COMMERCIAL

## 2024-06-18 DIAGNOSIS — Z01.810 ENCOUNTER FOR PREPROCEDURAL CARDIOVASCULAR EXAMINATION: Primary | ICD-10-CM

## 2024-06-18 LAB — VWF AG ACT/NOR PPP IA: 101 % (ref 50–220)

## 2024-06-18 PROCEDURE — RXMED WILLOW AMBULATORY MEDICATION CHARGE

## 2024-06-18 RX ORDER — METOPROLOL TARTRATE 50 MG/1
50 TABLET ORAL ONCE
Qty: 3 TABLET | Refills: 0 | Status: SHIPPED | OUTPATIENT
Start: 2024-06-18 | End: 2024-06-19

## 2024-06-18 ASSESSMENT — ENCOUNTER SYMPTOMS
CONSTIPATION: 0
DIARRHEA: 0
RHINORRHEA: 0
LIGHT-HEADEDNESS: 0
FREQUENCY: 0
VOMITING: 0
TROUBLE SWALLOWING: 0
ARTHRALGIAS: 0
FATIGUE: 1
HEADACHES: 1
ABDOMINAL DISTENTION: 0
DYSURIA: 0
SLEEP DISTURBANCE: 0
SHORTNESS OF BREATH: 0
PALPITATIONS: 0
NUMBNESS: 0
WEAKNESS: 0
UNEXPECTED WEIGHT CHANGE: 0
NERVOUS/ANXIOUS: 0
COUGH: 0
MYALGIAS: 0
APPETITE CHANGE: 0
NAUSEA: 0

## 2024-06-18 NOTE — TELEPHONE ENCOUNTER
Pt called stating that Metoprolol was going to be called in prior to her procedure tomorrow. Didn't see that anything has been sent to pharmacy. She would like it sent to [facility] pharmacy.  Thank you!

## 2024-06-19 ENCOUNTER — HOSPITAL ENCOUNTER (OUTPATIENT)
Dept: RADIOLOGY | Facility: HOSPITAL | Age: 24
Discharge: HOME | End: 2024-06-19
Payer: COMMERCIAL

## 2024-06-19 ENCOUNTER — HOSPITAL ENCOUNTER (OUTPATIENT)
Dept: CARDIOLOGY | Facility: HOSPITAL | Age: 24
Discharge: HOME | End: 2024-06-19
Payer: COMMERCIAL

## 2024-06-19 VITALS — SYSTOLIC BLOOD PRESSURE: 120 MMHG | DIASTOLIC BLOOD PRESSURE: 64 MMHG

## 2024-06-19 DIAGNOSIS — R00.2 PALPITATIONS: Primary | ICD-10-CM

## 2024-06-19 DIAGNOSIS — R07.89 ATYPICAL CHEST PAIN: ICD-10-CM

## 2024-06-19 PROCEDURE — 2500000005 HC RX 250 GENERAL PHARMACY W/O HCPCS

## 2024-06-19 PROCEDURE — 2550000001 HC RX 255 CONTRASTS

## 2024-06-19 PROCEDURE — 93005 ELECTROCARDIOGRAM TRACING: CPT

## 2024-06-19 PROCEDURE — 2500000001 HC RX 250 WO HCPCS SELF ADMINISTERED DRUGS (ALT 637 FOR MEDICARE OP)

## 2024-06-19 PROCEDURE — 75574 CT ANGIO HRT W/3D IMAGE: CPT

## 2024-06-19 RX ORDER — METOPROLOL TARTRATE 1 MG/ML
5 INJECTION, SOLUTION INTRAVENOUS ONCE AS NEEDED
Status: DISCONTINUED | OUTPATIENT
Start: 2024-06-19 | End: 2024-06-20 | Stop reason: HOSPADM

## 2024-06-19 RX ORDER — LORAZEPAM 2 MG/ML
0.5 INJECTION INTRAMUSCULAR EVERY 5 MIN PRN
Status: DISCONTINUED | OUTPATIENT
Start: 2024-06-19 | End: 2024-06-20 | Stop reason: HOSPADM

## 2024-06-19 RX ORDER — NITROGLYCERIN 0.4 MG/1
0.8 TABLET SUBLINGUAL ONCE
Status: COMPLETED | OUTPATIENT
Start: 2024-06-19 | End: 2024-06-19

## 2024-06-19 RX ORDER — METOPROLOL TARTRATE 100 MG/1
100 TABLET ORAL ONCE AS NEEDED
Status: DISCONTINUED | OUTPATIENT
Start: 2024-06-19 | End: 2024-06-20 | Stop reason: HOSPADM

## 2024-06-19 RX ORDER — METOPROLOL TARTRATE 100 MG/1
100 TABLET ORAL ONCE
Status: DISCONTINUED | OUTPATIENT
Start: 2024-06-19 | End: 2024-06-20 | Stop reason: HOSPADM

## 2024-06-19 RX ORDER — METOPROLOL TARTRATE 1 MG/ML
5 INJECTION, SOLUTION INTRAVENOUS ONCE
Status: COMPLETED | OUTPATIENT
Start: 2024-06-19 | End: 2024-06-19

## 2024-06-21 LAB
ATRIAL RATE: 61 BPM
ELECTRONICALLY SIGNED BY: ABNORMAL
FACTOR V LEIDEN INTERPRETATION: ABNORMAL
FACTOR V LEIDEN RESULT: ABNORMAL
P AXIS: 53 DEGREES
P OFFSET: 205 MS
P ONSET: 143 MS
PR INTERVAL: 146 MS
Q ONSET: 216 MS
QRS COUNT: 10 BEATS
QRS DURATION: 94 MS
QT INTERVAL: 414 MS
QTC CALCULATION(BAZETT): 416 MS
QTC FREDERICIA: 416 MS
R AXIS: 57 DEGREES
T AXIS: 39 DEGREES
T OFFSET: 423 MS
VENTRICULAR RATE: 61 BPM

## 2024-06-24 ENCOUNTER — TELEPHONE (OUTPATIENT)
Dept: CARDIOLOGY | Facility: CLINIC | Age: 24
End: 2024-06-24
Payer: COMMERCIAL

## 2024-06-24 NOTE — TELEPHONE ENCOUNTER
----- Message from Destiin Kauffman CMA sent at 6/24/2024  9:56 AM EDT -----  Pt has been notified.   ----- Message -----  From: KENROY Hull-CNP, DNP  Sent: 6/24/2024   9:41 AM EDT  To: Do Diaz Card1 Clinical Support Staff    Please let Louise know her coronary CTA was relatively normal and do not feel any further ischemic evaluation is needed at this time. Still awaiting for monitor results to return

## 2024-07-01 ENCOUNTER — TELEPHONE (OUTPATIENT)
Dept: HEMATOLOGY/ONCOLOGY | Facility: CLINIC | Age: 24
End: 2024-07-01
Payer: COMMERCIAL

## 2024-07-02 ENCOUNTER — DOCUMENTATION (OUTPATIENT)
Dept: HEMATOLOGY/ONCOLOGY | Facility: CLINIC | Age: 24
End: 2024-07-02
Payer: COMMERCIAL

## 2024-07-02 LAB — BODY SURFACE AREA: 2.41 M2

## 2024-07-02 NOTE — PROGRESS NOTES
Management implications of Factor V Leiden or the prothrombin [id number] mutation  Patient population Intervention(s) that may be appropriate   VTE risk reduction for asymptomatic individuals   All individuals Surgery - Treat as a high-risk group for purposes of surgical VTE prophylaxis. No differences in the choice or dosing of prophylactic anticoagulant.  Routine anticoagulation - Generally not indicated. The rare homozygote or double heterozygote may choose prophylactic anticoagulation if they place an especially high value on VTE risk reduction and are willing to accept the increased risk of bleeding.  Airline travel - Frequent ambulation; below-the-knee graduated compression stockings for selected individuals.   Additional considerations for women Estrogen-containing contraceptives - Avoid in homozygotes and double heterozygotes. Avoid in heterozygotes if an acceptable alternative is available. If used, select a pill with a low estrogen dose.  Pregnancy - Routine antepartum or postpartum anticoagulation is not required for heterozygotes. Intermediate-dose anticoagulation antepartum and postpartum for the rare homozygote or double heterozygote. Low-dose postpartum anticoagulation after  delivery for two weeks in heterozygotes.   VTE treatment     Heterozygotes - No difference from the general population.  Homozygotes or double heterozygotes - Indefinite anticoagulation unless contraindicated.   Pregnancy morbidity     No difference from the general population. Some women may reasonably choose aspirin or anticoagulation if no other explanation can be found.   Family members   First- and second-degree relatives Testing is not always necessary. The decision is individualized based on whether the information would have clinical utility.     Patient was found to be heterozygous for the factor V Leiden mutation as she was anticipating.  She knew that her father had tested positive as had one of her siblings.  I  called patient and discussed this with her and made her aware of this information in My Chart from Up-To-Date

## 2024-07-08 NOTE — PROGRESS NOTES
CHIEF COMPLAINT  Follow up testing    HISTORY OF PRESENT ILLNESS    Cardiovascular hx:    Louise presents for test results. She reports symptoms continue with no worsening of symptoms.       Cardiovascular testin-day holter monitor ()-predominant rhythm of sinus with average rate of 96 bpm; PAC and PVC burden <1%  Coronary CTA ()-significant sinus arrhythmia causing marked motion artifact; no obvious disease in the LAD/diagonal or proximal left circumflex system  Exercise stress test ()-METS of 7.0; exercise capacity is average for age; no EKG changes suggestive of ischemia, however, patient did have chest discomfort with exercise resulting in stopping of stress test        Past Medical, Surgical, and Family History reviewed and updated in chart.     Reviewed all medications by prescribing practitioner or clinical pharmacist (such as prescriptions, OTCs, herbal therapies and supplements) and documented in the medical record.    Past Medical History  Past Medical History:   Diagnosis Date    Anxiety     Asthma ([facility])     Depression     Lactose intolerance, unspecified     Lactose intolerance    Localized swelling, mass and lump, unspecified upper limb 2022    Axillary lump    Pain in right lower leg 2022    Right calf pain    PCOS (polycystic ovarian syndrome)     Personal history of other diseases of the respiratory system     History of asthma       Social History  Social History     Tobacco Use    Smoking status: Never    Smokeless tobacco: Current    Tobacco comments:     Vapes on and off   Vaping Use    Vaping status: Every Day    Substances: Nicotine    Devices: Disposable   Substance Use Topics    Alcohol use: Yes    Drug use: Never       Family History     Family History   Problem Relation Name Age of Onset    Other (diabetes mellitus due to underlying condition with hyperglycemia unspecified whether long term insulin use) Mother      Kidney cancer  Mother      Other (tonsil cancer) Mother      Thyroid disease Mother      Other (mental problems) Mother      Hypertension Mother      Anesthesia problems Mother      Other (factor V inhibitor disorder) Father      Hypertension Father      Blood Disorder Father      Thyroid disease Sister      Other (mental problems) Sister      Blood Disorder Sister      Anesthesia problems Sister      Other (mental problems) Brother          Allergies:  Allergies   Allergen Reactions    Azithromycin Unknown    Keflex [Cephalexin] Hives        Outpatient Medications:  Current Outpatient Medications   Medication Instructions    albuterol (Ventolin HFA) 90 mcg/actuation inhaler 2 puffs, inhalation, Every 6 hours PRN    busPIRone (BUSPAR) 5 mg, oral, 3 times daily PRN    levonorgestreL-ethinyl estrad (Altavera, 28,) 0.15-0.03 mg tablet 1 tablet, oral, Daily    metFORMIN XR (GLUCOPHAGE-XR) 1,000 mg, oral, 2 times daily (morning and late afternoon), Do not crush, chew, or split.    metoprolol succinate XL (TOPROL-XL) 25 mg, oral, Daily, Do not crush or chew.    sertraline (ZOLOFT) 100 mg, oral, Daily          Labs:   CMP:  Recent Labs     06/05/24  1332 05/08/24  1852 11/10/23  0701 03/10/23  1502 01/10/23  0909    137 136 139 138   K 3.8 4.1 3.5 3.7 3.8    104 102 106 104   CO2 26 25 27 27 25   ANIONGAP 13 12 11 10 13   BUN 12 11 10 18 11   CREATININE 0.90 0.81 0.79 0.78 0.79   EGFR >90 >90 >90  --   --    MG  --  1.88  --   --   --      Recent Labs     05/08/24  1852 03/10/23  1502   ALBUMIN 4.5 4.3   ALKPHOS 63 73   ALT 19 12   AST 15 14   BILITOT 0.3 0.3   LIPASE 23 28     CBC:  Recent Labs     05/08/24  1852 11/10/23  0701 03/10/23  1502 01/10/23  0909 08/04/21  1240   WBC 8.4 7.3 8.4 6.9  --    HGB 14.7 15.1 13.7 15.0 14.7   HCT 43.7 43.4 41.6 44.7 43.3    214 241 273  --    MCV 82 85 88 86  --      COAG:   Recent Labs     06/17/24  1454 05/08/24  1852   INR 1.0 1.0   DDIMERVTE  --  488     ABO:   Recent Labs     " 01/10/23  0909   ABO O     HEME/ENDO:  Recent Labs     06/05/24  1332 10/23/20  1004   TSH 1.30 0.97      CARDIAC:   Recent Labs     05/08/24 2001 05/08/24  1852   TROPHS <3 <3   No results for input(s): \"CHOL\", \"LDLF\", \"HDL\", \"TRIG\" in the last 28034 hours.  MICRO: No results for input(s): \"ESR\", \"CRP\", \"PROCAL\" in the last 69779 hours.  No results found for the last 90 days.    Notable Studies: imaging personally reviewed   EKG:  Encounter Date: 06/19/24   ECG 12 Lead   Result Value    Ventricular Rate 61    Atrial Rate 61    KS Interval 146    QRS Duration 94    QT Interval 414    QTC Calculation(Bazett) 416    P Axis 53    R Axis 57    T Axis 39    QRS Count 10    Q Onset 216    P Onset 143    P Offset 205    T Offset 423    QTC Fredericia 416    Narrative    Sinus rhythm with marked sinus arrhythmia  Otherwise normal ECG  When compared with ECG of 06-JUN-2024 10:37,  Vent. rate has decreased BY  38 BPM  Nonspecific T wave abnormality no longer evident in Lateral leads  Confirmed by Wilton Norton ([id number]) on 6/21/2024 3:19:52 PM     Echocardiogram: No results found for this or any previous visit from the past 1825 days.    Stress Testing: No results found for this or any previous visit from the past 1825 days.    Cardiac Catheterization: No results found for this or any previous visit from the past 1825 days.  No results found for this or any previous visit from the past 3650 days.         REVIEW OF SYSTEMS  A 10-point system review was completed and was negative except as noted in the HPI.      VITALS  Vitals:    07/10/24 0937   BP: 118/72   Pulse: 89   SpO2: 98%       PHYSICAL EXAM  General: awake, alert and oriented. No acute distress.   Skin: no visible skin lesions  Neurological: gait steady  Psychiatric: appropriate mood and affect; good judgment and insight          ASSESSMENT AND PLAN  Assessment/Plan   Diagnoses and all orders for this visit:  Palpitations  Inappropriate sinus tachycardia " ([facility])  Encounter to discuss test results  -Reviewed test results  -Will trial Toprol XL 25mg daily and call patient in a week to evaluate effectiveness of medication and symptoms        RTC: 3 months       Thank you for allowing me to participate in the care of this patient. Please reach me out if you have any questions or if you need any clarifications regarding the patient's care.    Tracey Weber, MELVINA, APRN, FNP-C  Division of Cardiovascular Medicine  [facility]  [facility]

## 2024-07-10 ENCOUNTER — APPOINTMENT (OUTPATIENT)
Dept: CARDIOLOGY | Facility: CLINIC | Age: 24
End: 2024-07-10
Payer: COMMERCIAL

## 2024-07-10 VITALS
WEIGHT: 278.3 LBS | SYSTOLIC BLOOD PRESSURE: 118 MMHG | HEIGHT: 66 IN | OXYGEN SATURATION: 98 % | BODY MASS INDEX: 44.72 KG/M2 | HEART RATE: 89 BPM | DIASTOLIC BLOOD PRESSURE: 72 MMHG

## 2024-07-10 DIAGNOSIS — I47.11 INAPPROPRIATE SINUS TACHYCARDIA (CMS-HCC): ICD-10-CM

## 2024-07-10 DIAGNOSIS — R00.2 PALPITATIONS: Primary | ICD-10-CM

## 2024-07-10 DIAGNOSIS — Z71.2 ENCOUNTER TO DISCUSS TEST RESULTS: ICD-10-CM

## 2024-07-10 PROCEDURE — RXMED WILLOW AMBULATORY MEDICATION CHARGE

## 2024-07-10 PROCEDURE — 99214 OFFICE O/P EST MOD 30 MIN: CPT

## 2024-07-10 PROCEDURE — 3008F BODY MASS INDEX DOCD: CPT

## 2024-07-10 RX ORDER — METOPROLOL SUCCINATE 25 MG/1
25 TABLET, EXTENDED RELEASE ORAL DAILY
Qty: 30 TABLET | Refills: 11 | Status: SHIPPED | OUTPATIENT
Start: 2024-07-10 | End: 2025-07-10

## 2024-07-15 ENCOUNTER — PHARMACY VISIT (OUTPATIENT)
Dept: PHARMACY | Facility: CLINIC | Age: 24
End: 2024-07-15
Payer: MEDICARE

## 2024-07-15 ENCOUNTER — APPOINTMENT (OUTPATIENT)
Dept: PRIMARY CARE | Facility: CLINIC | Age: 24
End: 2024-07-15
Payer: COMMERCIAL

## 2024-07-15 VITALS
HEIGHT: 66 IN | HEART RATE: 84 BPM | SYSTOLIC BLOOD PRESSURE: 134 MMHG | OXYGEN SATURATION: 95 % | WEIGHT: 278.4 LBS | BODY MASS INDEX: 44.74 KG/M2 | DIASTOLIC BLOOD PRESSURE: 78 MMHG

## 2024-07-15 DIAGNOSIS — Z13.220 SCREENING, LIPID: ICD-10-CM

## 2024-07-15 DIAGNOSIS — E66.01 CLASS 3 SEVERE OBESITY DUE TO EXCESS CALORIES WITHOUT SERIOUS COMORBIDITY WITH BODY MASS INDEX (BMI) OF 40.0 TO 44.9 IN ADULT (MULTI): Primary | ICD-10-CM

## 2024-07-15 DIAGNOSIS — E28.2 POLYCYSTIC OVARIES: ICD-10-CM

## 2024-07-15 PROCEDURE — 99214 OFFICE O/P EST MOD 30 MIN: CPT | Performed by: STUDENT IN AN ORGANIZED HEALTH CARE EDUCATION/TRAINING PROGRAM

## 2024-07-15 PROCEDURE — RXMED WILLOW AMBULATORY MEDICATION CHARGE

## 2024-07-15 PROCEDURE — 3008F BODY MASS INDEX DOCD: CPT | Performed by: STUDENT IN AN ORGANIZED HEALTH CARE EDUCATION/TRAINING PROGRAM

## 2024-07-15 ASSESSMENT — PATIENT HEALTH QUESTIONNAIRE - PHQ9
2. FEELING DOWN, DEPRESSED OR HOPELESS: NOT AT ALL
SUM OF ALL RESPONSES TO PHQ9 QUESTIONS 1 & 2: 0
1. LITTLE INTEREST OR PLEASURE IN DOING THINGS: NOT AT ALL

## 2024-07-15 NOTE — PROGRESS NOTES
Subjective:  Louise Santoro is a 23 y.o. female who presents to clinic today for Follow-up (6 WK WT )      Started metformin 6/6/24. Since then has noticed that she had bad headaches and felt dizzy and nauseas. She notes that she felt improved after stopping it.     She is going to start metoprolol due to inappropriate sinus tachycardia. She was seen an evaluated by cardiology NP.     She continues to exercise regularly and has been trying to manage portion control.     Review of Systems    Assessment/Plan:  Louise Santoro is a 23 y.o. female with a history of factor V leiden, anxiety, PCOS, inappropriate sinus tachycardia who presents to clinic today to address the following issues:   1. Class 3 severe obesity due to excess calories without serious comorbidity with body mass index (BMI) of 40.0 to 44.9 in adult (Multi)  tirzepatide, weight loss, (Zepbound) 2.5 mg/0.5 mL injection      2. Screening, lipid  Lipid panel      3. Polycystic ovaries          Discussed with Louise options for weight loss and ultimately since she is unable to tolerate metformin will trial tirzepatide. Discussed risks/benefits/alternatives.   Plan to screen lipid panel.   Discussed importance of pairing carbohydrates with protein.     Additionally, recommended HPV vaccination for Louise.  Problem List Items Addressed This Visit       Polycystic ovaries    Class 3 severe obesity due to excess calories without serious comorbidity with body mass index (BMI) of 45.0 to 49.9 in adult (Multi) - Primary    Relevant Medications    tirzepatide, weight loss, (Zepbound) 2.5 mg/0.5 mL injection     Other Visit Diagnoses       Screening, lipid        Relevant Orders    Lipid panel            There are no Patient Instructions on file for this visit.    Follow up: once started on GLP1    Return precautions discussed.  An After Visit Summary was given to the patient.  All questions were answered and patient in agreement with  "plan.    Objective:  /78   Pulse 84   Ht 1.676 m (5' 6\")   Wt 126 kg (278 lb 6.4 oz)   SpO2 95%   BMI 44.93 kg/m²     Physical Exam  Vitals and nursing note reviewed.   Constitutional:       General: She is not in acute distress.     Appearance: Normal appearance. She is obese.   HENT:      Head: Normocephalic and atraumatic.      Mouth/Throat:      Mouth: Mucous membranes are moist.   Eyes:      General: No scleral icterus.        Right eye: No discharge.         Left eye: No discharge.      Extraocular Movements: Extraocular movements intact.      Conjunctiva/sclera: Conjunctivae normal.   Cardiovascular:      Rate and Rhythm: Normal rate and regular rhythm.   Pulmonary:      Effort: Pulmonary effort is normal. No respiratory distress.      Breath sounds: Normal breath sounds.   Skin:     General: Skin is warm and dry.   Neurological:      General: No focal deficit present.      Mental Status: She is alert and oriented to person, place, and time.   Psychiatric:         Attention and Perception: Attention normal.         Mood and Affect: Mood normal.         Speech: Speech normal.         Behavior: Behavior normal.         Cognition and Memory: Cognition and memory normal.         Judgment: Judgment normal.         I spent 31 minutes in total time for this visit including all related clinical activities before, during, and after the visit excluding other billable activities/procedure time.     Sabrina Davey MD    "

## 2024-07-16 PROBLEM — B37.9 YEAST INFECTION: Status: RESOLVED | Noted: 2023-03-13 | Resolved: 2024-07-16

## 2024-07-16 PROBLEM — O02.1 ABORTION, MISSED (HHS-HCC): Status: RESOLVED | Noted: 2023-03-13 | Resolved: 2024-07-16

## 2024-07-16 PROBLEM — L02.91 ABSCESS: Status: RESOLVED | Noted: 2023-03-13 | Resolved: 2024-07-16

## 2024-07-16 PROBLEM — R00.2 PALPITATIONS: Status: RESOLVED | Noted: 2024-05-22 | Resolved: 2024-07-16

## 2024-07-16 PROBLEM — J01.90 SINUSITIS, ACUTE: Status: RESOLVED | Noted: 2023-03-13 | Resolved: 2024-07-16

## 2024-07-16 PROBLEM — I73.9 CLAUDICATION OF RIGHT LOWER EXTREMITY (CMS-HCC): Status: RESOLVED | Noted: 2023-03-13 | Resolved: 2024-07-16

## 2024-07-18 ENCOUNTER — OFFICE VISIT (OUTPATIENT)
Dept: URGENT CARE | Facility: CLINIC | Age: 24
End: 2024-07-18
Payer: COMMERCIAL

## 2024-07-18 ENCOUNTER — APPOINTMENT (OUTPATIENT)
Dept: PRIMARY CARE | Facility: CLINIC | Age: 24
End: 2024-07-18
Payer: COMMERCIAL

## 2024-07-18 ENCOUNTER — TELEPHONE (OUTPATIENT)
Dept: PRIMARY CARE | Facility: CLINIC | Age: 24
End: 2024-07-18

## 2024-07-18 ENCOUNTER — TELEPHONE (OUTPATIENT)
Dept: CARDIOLOGY | Facility: CLINIC | Age: 24
End: 2024-07-18

## 2024-07-18 VITALS
HEART RATE: 68 BPM | OXYGEN SATURATION: 99 % | WEIGHT: 260 LBS | HEIGHT: 65 IN | TEMPERATURE: 97.9 F | BODY MASS INDEX: 43.32 KG/M2 | SYSTOLIC BLOOD PRESSURE: 134 MMHG | RESPIRATION RATE: 16 BRPM | DIASTOLIC BLOOD PRESSURE: 85 MMHG

## 2024-07-18 DIAGNOSIS — R30.0 DYSURIA: Primary | ICD-10-CM

## 2024-07-18 LAB
POC APPEARANCE, URINE: CLEAR
POC BILIRUBIN, URINE: NEGATIVE
POC BLOOD, URINE: NEGATIVE
POC COLOR, URINE: YELLOW
POC GLUCOSE, URINE: NEGATIVE MG/DL
POC KETONES, URINE: NEGATIVE MG/DL
POC LEUKOCYTES, URINE: NEGATIVE
POC NITRITE,URINE: NEGATIVE
POC PH, URINE: 6.5 PH
POC PROTEIN, URINE: NEGATIVE MG/DL
POC SPECIFIC GRAVITY, URINE: 1.02
POC UROBILINOGEN, URINE: 0.2 EU/DL

## 2024-07-18 PROCEDURE — 87086 URINE CULTURE/COLONY COUNT: CPT

## 2024-07-18 PROCEDURE — 81003 URINALYSIS AUTO W/O SCOPE: CPT | Mod: QW | Performed by: PHYSICIAN ASSISTANT

## 2024-07-18 PROCEDURE — 99212 OFFICE O/P EST SF 10 MIN: CPT | Performed by: PHYSICIAN ASSISTANT

## 2024-07-18 ASSESSMENT — ENCOUNTER SYMPTOMS
RHINORRHEA: 0
WHEEZING: 0
SORE THROAT: 0
SINUS PRESSURE: 0
ABDOMINAL PAIN: 0
EYE PAIN: 0
FLANK PAIN: 0
DYSURIA: 1
CHILLS: 0
FREQUENCY: 0
FEVER: 0
SINUS PAIN: 0
STRIDOR: 0
HEMATURIA: 0
TROUBLE SWALLOWING: 0
SHORTNESS OF BREATH: 0

## 2024-07-18 NOTE — TELEPHONE ENCOUNTER
----- Message from Anjelica HARDWICK sent at 7/18/2024 11:03 AM EDT -----  PT NOTIFIED AND WILL CUT IT IN HALF  ----- Message -----  From: Sarah E Markley, LPN  Sent: 7/18/2024  10:58 AM EDT  To: Do Joe Jeffery Clinical Support Staff    [facility]  ----- Message -----  From: BEATRIZ Hull DNP  Sent: 7/18/2024   9:36 AM EDT  To: Sarah E Markley, LPN    Ok, she can try cutting pill in half and see if that helps her symptoms.  ----- Message -----  From: Sarah E Markley, LPN  Sent: 7/18/2024   9:09 AM EDT  To: BEATRIZ Hull DNP; #    Pt notified and reports decrease in flutters, but having a little nausea/tiredness. Pt denies any feeling of heart beating fast.  ----- Message -----  From: Destini Kauffman CMA  Sent: 7/18/2024   8:52 AM EDT  To: Do Joe Jeffery Clinical Support Staff    Left a message for the pt to call the office back.  ----- Message -----  From: BEATRIZ Hull DNP  Sent: 7/18/2024   8:00 AM EDT  To: Do Joe Jeffery Clinical Support Staff    Please call and see how patient has been feeling since starting Toprol Xl 25mg daily

## 2024-07-18 NOTE — TELEPHONE ENCOUNTER
LEFT MESSAGE FOR PATIENT TO CALL. REGARDING IUD PRIOR AUTHORIZATION. THIS IS A NEW PROCEDURE FOR OUR OFFICE. WE ARE LEARNING HOW TO OBTAIN THAT PRIOR TO SCHEDULING PATIENTS FOR THE PROCEDURE. WE ARE ACTIVELY WORKING ON OBTAINING THE PA AND ENSURING DR. ROGERS HAS THE SUPPLIES TO PERFORM THE PROCEDURE. ONCE THE PA IS OBTAINED AND THE SUPPLIES ARE IN THE OFFICE WE WILL CALL HER FOR AN APPOINTMENT.

## 2024-07-18 NOTE — PROGRESS NOTES
[facility] URGENT CARE   NICO NOTE:    Name: Louise Santoro, 23 y.o.  female                   CSN:[id number] MRN:[id number]    PCP: Sabrina Davey MD    Chief Complaint: UTI (URGENCY, DYSURIA, LOWER BACK PAIN X 4 DAYS)    PMHx:    Past Medical History:   Diagnosis Date    , missed ([facility]) 2023    Abscess 2023    Anxiety     Asthma ([facility])     Depression     Lactose intolerance, unspecified     Lactose intolerance    Localized swelling, mass and lump, unspecified upper limb 2022    Axillary lump    Pain in right lower leg 2022    Right calf pain    PCOS (polycystic ovarian syndrome)     Personal history of other diseases of the respiratory system     History of asthma                               Allergies   Allergen Reactions    Azithromycin Unknown    Keflex [Cephalexin] Hives                               Medication Documentation Review Audit       Reviewed by Sue Del Valle MA (Medical Assistant) on 24 at 1731      Medication Order Taking? Sig Documenting Provider Last Dose Status   albuterol (Ventolin HFA) 90 mcg/actuation inhaler [id number] Yes Inhale 2 puffs every 6 hours if needed for wheezing or shortness of breath. Collins Reza PA-C Taking Active   busPIRone (Buspar) 5 mg tablet [id number] Yes Take 1 tablet (5 mg) by mouth 3 times a day as needed (anxiety). Collins Reza PA-C Taking Active   levonorgestreL-ethinyl estrad (Altavera, 28,) 0.15-0.03 mg tablet [id number] Yes Take 1 tablet by mouth once daily. James Molina MD Taking Active   metFORMIN XR (Glucophage-XR) 500 mg 24 hr tablet [id number] Yes Take 2 tablets (1,000 mg) by mouth 2 times daily (morning and late afternoon). Do not crush, chew, or split. Sabrina Davey MD Taking Active   metoprolol succinate XL (Toprol-XL) 25 mg 24 hr tablet [id number] Yes Take 1 tablet (25 mg) by mouth once daily. Do not crush or chew. Tracey Weber, APRN-CNP, DNP Taking Active   sertraline (Zoloft) 100 mg tablet  [id number] Yes Take 1 tablet (100 mg) by mouth once daily. Sabrina Davey MD Taking Active   tirzepatide, weight loss, (Zepbound) 2.5 mg/0.5 mL injection [id number] Yes Inject 2.5 mg under the skin every 7 days. Sabrina Davey MD Taking Active                  PMSx:    Past Surgical History:   Procedure Laterality Date    DILATION AND CURETTAGE OF UTERUS  2023    OTHER SURGICAL HISTORY  04/13/2021    [place] tooth extraction    OTHER SURGICAL HISTORY  04/06/2022    Tonsillectomy       Social Hx:     Social History     Tobacco Use    Smoking status: Never    Smokeless tobacco: Current    Tobacco comments:     Vapes on and off   Substance Use Topics    Alcohol use: Yes       Patient is a 23-year-old female who presents to the urgent care with a chief complaint of dysuria and urinary frequency.  She states that she has had pain for approximately 4 days.  She reports occasional low back pain.  She states that she recently got off her menstrual cycle, had similar symptoms in the past with this and has not had a UTI but also 1 instance was noted to have a UTI with similar symptoms.  She denies any fever or chills.  No nausea or vomiting.  No chest pain or shortness of breath.  She denies any vaginal pain or discharge. Denies any concern or chance of pregnancy      UTI  Associated symptoms: no abdominal pain, no chest pain, no ear pain, no fever, no rash, no rhinorrhea, no shortness of breath, no sore throat and no wheezing          Review of Systems   Constitutional:  Negative for chills and fever.   HENT:  Negative for ear pain, rhinorrhea, sinus pressure, sinus pain, sore throat and trouble swallowing.    Eyes:  Negative for pain.   Respiratory:  Negative for shortness of breath, wheezing and stridor.    Cardiovascular:  Negative for chest pain.   Gastrointestinal:  Negative for abdominal pain.   Genitourinary:  Positive for dysuria. Negative for flank pain, frequency, hematuria, menstrual problem and pelvic pain.    Skin:  Negative for rash.   All other systems reviewed and are negative.        Physical Exam  Vitals and nursing note reviewed.   Constitutional:       Appearance: Normal appearance.   HENT:      Head: Normocephalic and atraumatic.      Right Ear: Tympanic membrane, ear canal and external ear normal.      Left Ear: Tympanic membrane, ear canal and external ear normal.      Nose: Nose normal.      Mouth/Throat:      Mouth: Mucous membranes are moist.   Eyes:      Extraocular Movements: Extraocular movements intact.      Conjunctiva/sclera: Conjunctivae normal.   Cardiovascular:      Rate and Rhythm: Normal rate and regular rhythm.      Pulses: Normal pulses.      Heart sounds: Normal heart sounds.   Pulmonary:      Effort: Pulmonary effort is normal. No respiratory distress.      Breath sounds: Normal breath sounds. No stridor. No wheezing or rhonchi.   Abdominal:      General: There is no distension.      Palpations: Abdomen is soft. There is no mass.      Tenderness: There is no abdominal tenderness. There is no right CVA tenderness, left CVA tenderness, guarding or rebound.      Hernia: No hernia is present.   Musculoskeletal:         General: Normal range of motion.      Cervical back: Normal range of motion and neck supple. No rigidity.   Skin:     General: Skin is warm and dry.   Neurological:      General: No focal deficit present.      Mental Status: She is alert.       Vitals:    07/18/24 1735   BP: 134/85   Pulse: 68   Resp: 16   Temp: 36.6 °C (97.9 °F)   SpO2: 99%         LABORATORY @ RADIOLOGICAL IMAGING (if done):   No results found.   No results found for this or any previous visit (from the past 24 hour(s)).      UC COURSE/MEDICAL DECISION MAKING:  Patient is afebrile and nontoxic-appearing.  Presents to urgent care with a chief complaint of urinary symptoms.  Urine POC dipstick shows no signs of infection.  Given that patient is symptomatic urine will be sent for culture.  No indication for  treating for UTI at this time given negative POC test.  Problem List Items Addressed This Visit    None  Visit Diagnoses       Dysuria    -  Primary    Relevant Orders    POCT UA Automated manually resulted    Urine Culture                Roma Alicea PA-C   Advanced Practice Provider  [facility] URG[facility]

## 2024-07-19 ENCOUNTER — PHARMACY VISIT (OUTPATIENT)
Dept: PHARMACY | Facility: CLINIC | Age: 24
End: 2024-07-19
Payer: MEDICARE

## 2024-07-21 LAB — BACTERIA UR CULT: ABNORMAL

## 2024-07-23 LAB
BACTERIA UR CULT: ABNORMAL
BACTERIA UR CULT: ABNORMAL

## 2024-08-06 ENCOUNTER — PATIENT MESSAGE (OUTPATIENT)
Age: 24
End: 2024-08-06
Payer: COMMERCIAL

## 2024-08-06 DIAGNOSIS — E66.01 CLASS 3 SEVERE OBESITY DUE TO EXCESS CALORIES WITHOUT SERIOUS COMORBIDITY WITH BODY MASS INDEX (BMI) OF 45.0 TO 49.9 IN ADULT (MULTI): Primary | ICD-10-CM

## 2024-08-07 PROCEDURE — RXMED WILLOW AMBULATORY MEDICATION CHARGE

## 2024-08-12 ENCOUNTER — PHARMACY VISIT (OUTPATIENT)
Dept: PHARMACY | Facility: CLINIC | Age: 24
End: 2024-08-12
Payer: MEDICARE

## 2024-08-15 ENCOUNTER — PHARMACY VISIT (OUTPATIENT)
Dept: PHARMACY | Facility: CLINIC | Age: 24
End: 2024-08-15
Payer: COMMERCIAL

## 2024-09-05 ENCOUNTER — PHARMACY VISIT (OUTPATIENT)
Dept: PHARMACY | Facility: CLINIC | Age: 24
End: 2024-09-05
Payer: MEDICARE

## 2024-09-05 PROCEDURE — RXMED WILLOW AMBULATORY MEDICATION CHARGE

## 2024-09-07 ENCOUNTER — PHARMACY VISIT (OUTPATIENT)
Dept: PHARMACY | Facility: CLINIC | Age: 24
End: 2024-09-07
Payer: MEDICARE

## 2024-09-07 PROCEDURE — RXMED WILLOW AMBULATORY MEDICATION CHARGE

## 2024-09-25 ENCOUNTER — APPOINTMENT (OUTPATIENT)
Dept: CARDIOLOGY | Facility: CLINIC | Age: 24
End: 2024-09-25
Payer: COMMERCIAL

## 2024-09-25 ENCOUNTER — TELEPHONE (OUTPATIENT)
Age: 24
End: 2024-09-25

## 2024-09-25 NOTE — TELEPHONE ENCOUNTER
Debra has already left for the day. I had Millicent mathis and the only IUD we could find was the Nexplanon. I can leave a note for Debra if you would like.

## 2024-09-25 NOTE — TELEPHONE ENCOUNTER
Hello can you confirm with Debra if we have any Liletta is in the office and if so if we can place it next week.  Okay to put her in any open slot that works for her.    Sabrina Davey MD

## 2024-09-25 NOTE — TELEPHONE ENCOUNTER
Can someone call and order a liletta? Or can we call maryann? I have asked about this several times and dont want to keep this patient waiting.    Thanks,  Sabrina Davey MD

## 2024-09-25 NOTE — TELEPHONE ENCOUNTER
"Patient called into office inquiring about IUD approval from PA started back in August. I could not find any messages regarding the prior auth but I did find the approval for the Liletta - buy and bill. I did inform patient of this approval. Her main concern is she has less than a week left of her OCP's and she didn't want any gap in protection. I informed her I would message pcp to get the \"game plan\". Please advise, thank you!   "

## 2024-10-02 ENCOUNTER — APPOINTMENT (OUTPATIENT)
Dept: OBSTETRICS AND GYNECOLOGY | Facility: CLINIC | Age: 24
End: 2024-10-02
Payer: COMMERCIAL

## 2024-10-02 VITALS
WEIGHT: 260.2 LBS | BODY MASS INDEX: 43.35 KG/M2 | HEIGHT: 65 IN | SYSTOLIC BLOOD PRESSURE: 128 MMHG | DIASTOLIC BLOOD PRESSURE: 80 MMHG

## 2024-10-02 DIAGNOSIS — Z30.430 ENCOUNTER FOR IUD INSERTION: ICD-10-CM

## 2024-10-02 DIAGNOSIS — Z12.4 SCREENING FOR MALIGNANT NEOPLASM OF CERVIX: ICD-10-CM

## 2024-10-02 DIAGNOSIS — Z97.5 CONTRACEPTION, DEVICE INTRAUTERINE: ICD-10-CM

## 2024-10-02 DIAGNOSIS — Z97.5 IUD (INTRAUTERINE DEVICE) IN PLACE: ICD-10-CM

## 2024-10-02 DIAGNOSIS — Z01.419 ENCOUNTER FOR GYNECOLOGICAL EXAMINATION WITHOUT ABNORMAL FINDING: Primary | ICD-10-CM

## 2024-10-02 LAB — PREGNANCY TEST URINE, POC: NEGATIVE

## 2024-10-02 PROCEDURE — 88175 CYTOPATH C/V AUTO FLUID REDO: CPT

## 2024-10-02 PROCEDURE — 58300 INSERT INTRAUTERINE DEVICE: CPT | Performed by: STUDENT IN AN ORGANIZED HEALTH CARE EDUCATION/TRAINING PROGRAM

## 2024-10-02 PROCEDURE — 99395 PREV VISIT EST AGE 18-39: CPT | Performed by: STUDENT IN AN ORGANIZED HEALTH CARE EDUCATION/TRAINING PROGRAM

## 2024-10-02 PROCEDURE — 87591 N.GONORRHOEAE DNA AMP PROB: CPT

## 2024-10-02 PROCEDURE — 87491 CHLMYD TRACH DNA AMP PROBE: CPT

## 2024-10-02 NOTE — PROGRESS NOTES
Assessment/Plan:  Louise Santoro is a 23 y.o. female who presents to clinic today to address the following issues:   1. Encounter for gynecological examination without abnormal finding        2. Screening for malignant neoplasm of cervix  THINPREP PAP      3. Contraception, device intrauterine  POCT pregnancy, urine manually resulted    C. trachomatis / N. gonorrhoeae, Amplified    C. trachomatis / N. gonorrhoeae, Amplified      4. IUD (intrauterine device) in place          Routine examination wnl. Pap smear obtained if wnl not due for 3 years. Obtained GCCT testing.     IUD inserted w/o difficulty, effective for 8 years.    Problem List Items Addressed This Visit       IUD (intrauterine device) in place    Overview     Liletta inserted 10/2/2024          Other Visit Diagnoses       Encounter for gynecological examination without abnormal finding    -  Primary    Screening for malignant neoplasm of cervix        Relevant Orders    THINPREP PAP    Contraception, device intrauterine        Relevant Orders    POCT pregnancy, urine manually resulted (Completed)    C. trachomatis / N. gonorrhoeae, Amplified            There are no Patient Instructions on file for this visit.    Follow up: 1 month string check    Return precautions discussed.  An After Visit Summary was given to the patient.  All questions were answered and patient in agreement with plan.    Subjective:  Louise Santoro is a 23 y.o. female who presents to clinic today for Contraception (In office for Liletta IUD insertion. )      HPI:    Gynecologic History:  (Please complete obstetric history in the history tab)  - Do you have any menstrual concerns? No  - Do you have any breast concerns? no    Sexual history (provider to ask):  - Have you been sexually active within the past year? yes  - What are the genders of your sexual partner(s)? male  - Are you or your spouse/partner wanting to become pregnant or have children in the next year?  "no      Does have hx of factor V leiden deficiency, will switch to liletta today since patient is at higher risk of clotting disorders    Review of Systems    Objective:  /80 (BP Location: Right arm, Patient Position: Sitting)   Ht 1.651 m (5' 5\")   Wt 118 kg (260 lb 3.2 oz)   LMP 09/10/2024 (Approximate)   BMI 43.30 kg/m²     Physical Exam  Vitals and nursing note reviewed. Exam conducted with a chaperone present.   Constitutional:       General: She is not in acute distress.     Appearance: Normal appearance.   HENT:      Head: Normocephalic and atraumatic.      Mouth/Throat:      Mouth: Mucous membranes are moist.   Eyes:      General: No scleral icterus.        Right eye: No discharge.         Left eye: No discharge.      Extraocular Movements: Extraocular movements intact.      Conjunctiva/sclera: Conjunctivae normal.   Pulmonary:      Effort: Pulmonary effort is normal. No respiratory distress.   Abdominal:      General: Abdomen is flat. There is no distension.      Palpations: Abdomen is soft.   Genitourinary:     General: Normal vulva.      Exam position: Supine.      Pubic Area: No rash or pubic lice.       Shaheed stage (genital): 5.      Vagina: Normal.      Cervix: Normal. No friability, lesion, erythema or cervical bleeding.   Skin:     General: Skin is warm and dry.   Neurological:      General: No focal deficit present.      Mental Status: She is alert and oriented to person, place, and time.   Psychiatric:         Attention and Perception: Attention normal.         Mood and Affect: Mood normal.         Speech: Speech normal.         Behavior: Behavior normal.         Cognition and Memory: Cognition and memory normal.         Judgment: Judgment normal.       Patient ID: Louise Santoro is a 23 y.o. female.    IUD Insertion    Performed by: Sabrina Davey MD  Authorized by: Sabrina Davey MD    Procedure: IUD insertion    Consent obtained by patient, parent, or legal power of  - " including discussion of procedure risks and benefits, patient questions answered, and patient education provided: yes    Pregnancy risk: reasonably certain the patient is not pregnant    Date/Time of Insertion:  10/2/2024 8:15 AM  Immediately prior to procedure a time out was called: yes    Pelvic exam performed: no    Speculum placed in vagina: yes    Cervix cleaned and prepped: yes    Tenaculum/Allis/Ring Forceps applied to cervix: yes    Anesthesia used: no    Uterus sound depth (cm):  6.5  Cervix manually dilated: no    IUD inserted without complications: yes    OSM: 52 mg levonorgestreL 20.4 mcg/24 hr (8 yrs) 52 mg  Strings trimmed to (cm):  4  Patient tolerated procedure well: yes    Inserted with ultrasound guidance: no    Transvaginal sono confirmed fundal placement: no    Estimated blood loss (mL):  2  Intended removal date: 8 years        I spent 30 minutes in total time for this visit including all related clinical activities before, during, and after the visit excluding other billable activities/procedure time.     Sabrina Davey MD

## 2024-10-03 LAB
C TRACH RRNA SPEC QL NAA+PROBE: NEGATIVE
N GONORRHOEA DNA SPEC QL PROBE+SIG AMP: NEGATIVE

## 2024-10-03 PROCEDURE — RXMED WILLOW AMBULATORY MEDICATION CHARGE

## 2024-10-04 ENCOUNTER — PATIENT MESSAGE (OUTPATIENT)
Dept: OBSTETRICS AND GYNECOLOGY | Facility: CLINIC | Age: 24
End: 2024-10-04
Payer: COMMERCIAL

## 2024-10-06 ENCOUNTER — PHARMACY VISIT (OUTPATIENT)
Dept: PHARMACY | Facility: CLINIC | Age: 24
End: 2024-10-06
Payer: MEDICARE

## 2024-10-09 ENCOUNTER — HOSPITAL ENCOUNTER (EMERGENCY)
Facility: HOSPITAL | Age: 24
Discharge: HOME | End: 2024-10-09
Attending: EMERGENCY MEDICINE
Payer: COMMERCIAL

## 2024-10-09 ENCOUNTER — HOSPITAL ENCOUNTER (OUTPATIENT)
Dept: CARDIOLOGY | Facility: HOSPITAL | Age: 24
Discharge: HOME | End: 2024-10-09
Payer: COMMERCIAL

## 2024-10-09 VITALS
TEMPERATURE: 97.9 F | WEIGHT: 250 LBS | SYSTOLIC BLOOD PRESSURE: 165 MMHG | HEIGHT: 65 IN | OXYGEN SATURATION: 99 % | RESPIRATION RATE: 18 BRPM | BODY MASS INDEX: 41.65 KG/M2 | DIASTOLIC BLOOD PRESSURE: 86 MMHG | HEART RATE: 102 BPM

## 2024-10-09 DIAGNOSIS — T78.40XA ALLERGIC REACTION, INITIAL ENCOUNTER: Primary | ICD-10-CM

## 2024-10-09 LAB
ATRIAL RATE: 101 BPM
P AXIS: 33 DEGREES
P OFFSET: 202 MS
P ONSET: 152 MS
PR INTERVAL: 126 MS
Q ONSET: 215 MS
QRS COUNT: 17 BEATS
QRS DURATION: 92 MS
QT INTERVAL: 346 MS
QTC CALCULATION(BAZETT): 448 MS
QTC FREDERICIA: 411 MS
R AXIS: 29 DEGREES
T AXIS: 9 DEGREES
T OFFSET: 388 MS
VENTRICULAR RATE: 101 BPM

## 2024-10-09 PROCEDURE — 93005 ELECTROCARDIOGRAM TRACING: CPT

## 2024-10-09 PROCEDURE — 2500000001 HC RX 250 WO HCPCS SELF ADMINISTERED DRUGS (ALT 637 FOR MEDICARE OP): Performed by: EMERGENCY MEDICINE

## 2024-10-09 PROCEDURE — 99283 EMERGENCY DEPT VISIT LOW MDM: CPT

## 2024-10-09 PROCEDURE — RXMED WILLOW AMBULATORY MEDICATION CHARGE

## 2024-10-09 PROCEDURE — 2500000004 HC RX 250 GENERAL PHARMACY W/ HCPCS (ALT 636 FOR OP/ED): Performed by: EMERGENCY MEDICINE

## 2024-10-09 RX ORDER — PREDNISONE 50 MG/1
50 TABLET ORAL DAILY
Qty: 3 TABLET | Refills: 0 | Status: SHIPPED | OUTPATIENT
Start: 2024-10-09 | End: 2024-10-13

## 2024-10-09 RX ORDER — DIPHENHYDRAMINE HCL 25 MG
50 CAPSULE ORAL ONCE
Status: COMPLETED | OUTPATIENT
Start: 2024-10-09 | End: 2024-10-09

## 2024-10-09 ASSESSMENT — PAIN SCALES - GENERAL
PAINLEVEL_OUTOF10: 0 - NO PAIN

## 2024-10-09 ASSESSMENT — PAIN - FUNCTIONAL ASSESSMENT: PAIN_FUNCTIONAL_ASSESSMENT: 0-10

## 2024-10-09 NOTE — Clinical Note
Louise Santoro was seen and treated in our emergency department on 10/9/2024.  She may return to work on 10/09/2024.       If you have any questions or concerns, please don't hesitate to call.      Kory Samaniego, DO

## 2024-10-09 NOTE — ED PROVIDER NOTES
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 Pt reports feeling hot and flushed. Has flushed cheeks. No visible rash. No stridor or SOB on arrival. Reports feeling like her chest was tight and that she felt like she could pass out       Patient presents to the emergency department out of concern for allergic reaction.  The patient states she has food allergy to shrimp and when she ordered lunch today she was accidentally given shrimp tacos instead of regular tacos and took a bite.  This was approximately 90 minutes ago.  The patient began having some facial flushing and throat irritation prompting the emergency room visit.  Took nothing prior to arrival as far as over-the-counter medications such as Benadryl.  Denies dyspnea      History provided by:  Patient   used: No            Patient History   Past Medical History:   Diagnosis Date    , missed ([facility]) 2023    Abscess 2023    Anxiety     Asthma     Depression     Lactose intolerance, unspecified     Lactose intolerance    Localized swelling, mass and lump, unspecified upper limb 2022    Axillary lump    Pain in right lower leg 2022    Right calf pain    PCOS (polycystic ovarian syndrome)     Personal history of other diseases of the respiratory system     History of asthma     Past Surgical History:   Procedure Laterality Date    DILATION AND CURETTAGE OF UTERUS      OTHER SURGICAL HISTORY  2021    [place] tooth extraction    OTHER SURGICAL HISTORY  2022    Tonsillectomy     Family History   Problem Relation Name Age of Onset    Other (diabetes mellitus due to underlying condition with hyperglycemia unspecified whether long term insulin use) Mother      Kidney cancer Mother      Other (tonsil cancer) Mother      Thyroid disease Mother      Other (mental problems) Mother      Hypertension  Mother      Anesthesia problems Mother      Other (factor V inhibitor disorder) Father      Hypertension Father      Blood Disorder Father      Thyroid disease Sister      Other (mental problems) Sister      Blood Disorder Sister      Anesthesia problems Sister      Other (mental problems) Brother       Social History     Tobacco Use    Smoking status: Never    Smokeless tobacco: Former    Tobacco comments:     Vapes on and off   Vaping Use    Vaping status: Every Day    Substances: Nicotine    Devices: Disposable   Substance Use Topics    Alcohol use: Not Currently    Drug use: Never       Physical Exam   ED Triage Vitals [10/09/24 1308]   Temperature Heart Rate Respirations BP   36.6 °C (97.9 °F) (!) 102 18 165/86      Pulse Ox Temp Source Heart Rate Source Patient Position   99 % Temporal -- --      BP Location FiO2 (%)     -- --       Physical Exam  Vitals and nursing note reviewed.   Constitutional:       General: She is not in acute distress.     Appearance: Normal appearance. She is obese. She is not ill-appearing, toxic-appearing or diaphoretic.      Comments: Speaking in full sentences.  Not tripoding or drooling.   HENT:      Head: Normocephalic and atraumatic.      Nose: Nose normal. No rhinorrhea.   Neck:      Comments: Trachea is midline  Cardiovascular:      Rate and Rhythm: Normal rate and regular rhythm.      Heart sounds: No murmur heard.  Pulmonary:      Effort: Pulmonary effort is normal.      Breath sounds: Normal breath sounds. No wheezing.   Abdominal:      General: Abdomen is flat. Bowel sounds are normal. There is no distension.      Palpations: Abdomen is soft.      Tenderness: There is no abdominal tenderness.   Musculoskeletal:         General: Normal range of motion.      Cervical back: Normal range of motion.   Skin:     General: Skin is warm and dry.      Findings: No rash.      Comments: Minimal facial flushing noted.  No urticarial lesions or other suspicious rashes   Neurological:       General: No focal deficit present.      Mental Status: She is alert and oriented to person, place, and time. Mental status is at baseline.   Psychiatric:         Mood and Affect: Mood normal.         Behavior: Behavior normal.         Thought Content: Thought content normal.         Judgment: Judgment normal.           ED Course & MDM   Diagnoses as of 10/09/24 1337   Allergic reaction, initial encounter                 No data recorded                                 Medical Decision Making  Twelve-lead EKG was interpreted by myself this was noted to contribute directly patient care.  Study reveals sinus tachycardia without evidence of acute ischemic changes.      Patient presents out of concern for an allergic reaction.  Clinically she appears quite well with no evidence of respiratory distress or anaphylaxis.  She will be treated with prednisone and Benadryl and continue the same for the next 3 days.  Instructed to return if worse and otherwise follow-up with her private physician.        Procedure  Procedures     Kory Samaniego,   10/09/24 1338       Kory Samaniego,   10/30/24 [id number]

## 2024-10-10 ENCOUNTER — PHARMACY VISIT (OUTPATIENT)
Dept: PHARMACY | Facility: CLINIC | Age: 24
End: 2024-10-10
Payer: MEDICARE

## 2024-10-11 LAB
CYTOLOGY CMNT CVX/VAG CYTO-IMP: NORMAL
LAB AP HPV GENOTYPE QUESTION: YES
LAB AP HPV HR: NORMAL
LABORATORY COMMENT REPORT: NORMAL
LMP START DATE: NORMAL
PATH REPORT.TOTAL CANCER: NORMAL

## 2024-10-14 ENCOUNTER — APPOINTMENT (OUTPATIENT)
Dept: CARDIOLOGY | Facility: CLINIC | Age: 24
End: 2024-10-14
Payer: COMMERCIAL

## 2024-10-14 ENCOUNTER — OFFICE VISIT (OUTPATIENT)
Dept: CARDIOLOGY | Facility: CLINIC | Age: 24
End: 2024-10-14
Payer: COMMERCIAL

## 2024-10-14 VITALS
HEIGHT: 65 IN | WEIGHT: 255 LBS | OXYGEN SATURATION: 98 % | BODY MASS INDEX: 42.49 KG/M2 | HEART RATE: 109 BPM | DIASTOLIC BLOOD PRESSURE: 76 MMHG | SYSTOLIC BLOOD PRESSURE: 110 MMHG

## 2024-10-14 DIAGNOSIS — R00.2 PALPITATIONS: ICD-10-CM

## 2024-10-14 PROCEDURE — 3008F BODY MASS INDEX DOCD: CPT

## 2024-10-14 PROCEDURE — 99214 OFFICE O/P EST MOD 30 MIN: CPT

## 2024-10-14 PROCEDURE — 4004F PT TOBACCO SCREEN RCVD TLK: CPT

## 2024-10-14 PROCEDURE — RXMED WILLOW AMBULATORY MEDICATION CHARGE

## 2024-10-14 RX ORDER — METOPROLOL SUCCINATE 25 MG/1
25 TABLET, EXTENDED RELEASE ORAL DAILY
Qty: 90 TABLET | Refills: 3 | Status: SHIPPED | OUTPATIENT
Start: 2024-10-14 | End: 2025-10-14

## 2024-10-14 NOTE — PROGRESS NOTES
CHIEF COMPLAINT  3 month follow up     HISTORY OF PRESENT ILLNESS    Cardiovascular hx:    Palpitations:  -Currently on Toprol XL 25mg daily   -Patient feels great and has no complaints with current plan of care          Cardiovascular testin-day holter monitor ()-predominant rhythm of sinus with average rate of 96 bpm; PAC and PVC burden <1%  Coronary CTA ()-significant sinus arrhythmia causing marked motion artifact; no obvious disease in the LAD/diagonal or proximal left circumflex system  Exercise stress test ()-METS of 7.0; exercise capacity is average for age; no EKG changes suggestive of ischemia, however, patient did have chest discomfort with exercise resulting in stopping of stress test        Past Medical, Surgical, and Family History reviewed and updated in chart.     Reviewed all medications by prescribing practitioner or clinical pharmacist (such as prescriptions, OTCs, herbal therapies and supplements) and documented in the medical record.    Past Medical History  Past Medical History:   Diagnosis Date    , missed ([facility]) 2023    Abscess 2023    Anxiety     Asthma     Depression     Lactose intolerance, unspecified     Lactose intolerance    Localized swelling, mass and lump, unspecified upper limb 2022    Axillary lump    Pain in right lower leg 2022    Right calf pain    PCOS (polycystic ovarian syndrome)     Personal history of other diseases of the respiratory system     History of asthma       Social History  Social History     Tobacco Use    Smoking status: Never    Smokeless tobacco: Former    Tobacco comments:     Vapes on and off   Vaping Use    Vaping status: Every Day    Substances: Nicotine    Devices: Disposable   Substance Use Topics    Alcohol use: Not Currently    Drug use: Never       Family History     Family History   Problem Relation Name Age of Onset    Other (diabetes mellitus due to underlying condition  with hyperglycemia unspecified whether long term insulin use) Mother      Kidney cancer Mother      Other (tonsil cancer) Mother      Thyroid disease Mother      Other (mental problems) Mother      Hypertension Mother      Anesthesia problems Mother      Other (factor V inhibitor disorder) Father      Hypertension Father      Blood Disorder Father      Thyroid disease Sister      Other (mental problems) Sister      Blood Disorder Sister      Anesthesia problems Sister      Other (mental problems) Brother          Allergies:  Allergies   Allergen Reactions    Azithromycin Unknown    Keflex [Cephalexin] Hives        Outpatient Medications:  Current Outpatient Medications   Medication Instructions    albuterol (Ventolin HFA) 90 mcg/actuation inhaler 2 puffs, inhalation, Every 6 hours PRN    busPIRone (BUSPAR) 5 mg, oral, 3 times daily PRN    levonorgestreL-ethinyl estrad ([place]a, 28,) 0.15-0.03 mg tablet 1 tablet, oral, Daily    metFORMIN XR (GLUCOPHAGE-XR) 1,000 mg, oral, 2 times daily (morning and late afternoon), Do not crush, chew, or split.    metoprolol succinate XL (TOPROL-XL) 25 mg, oral, Daily, Do not crush or chew.    sertraline (ZOLOFT) 100 mg, oral, Daily    Zepbound 2.5 mg, subcutaneous, Every 7 days    Zepbound 5 mg, subcutaneous, Every 7 days          Labs:   CMP:  Recent Labs     06/05/24  1332 05/08/24  1852 11/10/23  0701 03/10/23  1502 01/10/23  0909    137 136 139 138   K 3.8 4.1 3.5 3.7 3.8    104 102 106 104   CO2 26 25 27 27 25   ANIONGAP 13 12 11 10 13   BUN 12 11 10 18 11   CREATININE 0.90 0.81 0.79 0.78 0.79   EGFR >90 >90 >90  --   --    MG  --  1.88  --   --   --      Recent Labs     05/08/24  1852 03/10/23  1502   ALBUMIN 4.5 4.3   ALKPHOS 63 73   ALT 19 12   AST 15 14   BILITOT 0.3 0.3   LIPASE 23 28     CBC:  Recent Labs     05/08/24  1852 11/10/23  0701 03/10/23  1502 01/10/23  0909 08/04/21  1240   WBC 8.4 7.3 8.4 6.9  --    HGB 14.7 15.1 13.7 15.0 14.7   HCT 43.7 43.4  "41.6 44.7 43.3    214 241 273  --    MCV 82 85 88 86  --      COAG:   Recent Labs     06/17/24  1454 05/08/24  1852   INR 1.0 1.0   DDIMERVTE  --  488     ABO:   Recent Labs     01/10/23  0909   ABO O     HEME/ENDO:  Recent Labs     06/05/24  1332 10/23/20  1004   TSH 1.30 0.97      CARDIAC:   Recent Labs     05/08/24  2001 05/08/24  1852   TROPHS <3 <3   No results for input(s): \"CHOL\", \"LDLF\", \"HDL\", \"TRIG\" in the last 15648 hours.  MICRO: No results for input(s): \"ESR\", \"CRP\", \"PROCAL\" in the last 05820 hours.  Susceptibility data from last 90 days.  Collected Specimen Info Organism Amoxicillin/Clavulanate Ampicillin Ampicillin/Sulbactam Aztreonam Cefazolin Cefazolin (uncomplicated UTIs only) Cefepime Cefotaxime Ceftazidime Ceftriaxone Ciprofloxacin Ertapenem   07/18/24 Urine from Clean Catch/Voided Escherichia coli  R  R  R  I  R  R  S  R  R  R  S  S     Enterococcus faecalis   S          S      Collected Specimen Info Organism Gentamicin Levofloxacin Meropenem Nitrofurantoin Penicillin Piperacillin/Tazobactam Trimethoprim/Sulfamethoxazole Vancomycin   07/18/24 Urine from Clean Catch/Voided Escherichia coli  S   S  S   S  S      Enterococcus faecalis   S   S  S   R  S     Notable Studies: imaging personally reviewed   EKG:  Encounter Date: 10/09/24   ECG 12 lead   Result Value    Ventricular Rate 101    Atrial Rate 101    MN Interval 126    QRS Duration 92    QT Interval 346    QTC Calculation(Bazett) 448    P Axis 33    R Axis 29    T Axis 9    QRS Count 17    Q Onset 215    P Onset 152    P Offset 202    T Offset 388    QTC Fredericia 411    Narrative    Sinus tachycardia  Otherwise normal ECG  When compared with ECG of 19-JUN-2024 10:54,  Vent. rate has increased BY  40 BPM  T wave inversion now evident in Inferior leads  Nonspecific T wave abnormality now evident in Anterior leads  See ED provider note for full interpretation and clinical correlation  Confirmed by Roma Hutton ([id number]) on " 10/9/2024 5:52:03 PM     Echocardiogram: No results found for this or any previous visit from the past 1825 days.    Stress Testing: No results found for this or any previous visit from the past 1825 days.    Cardiac Catheterization: No results found for this or any previous visit from the past 1825 days.  No results found for this or any previous visit from the past 3650 days.         REVIEW OF SYSTEMS  A 10-point system review was completed and was negative except as noted in the HPI.      VITALS  Vitals:    10/14/24 1438   BP: 110/76   Pulse: 109   SpO2: 98%       PHYSICAL EXAM  General: awake, alert and oriented. No acute distress.   Skin: Skin is warm, dry and intact without rashes or lesions.  HEENT: normocephalic, atraumatic; conjunctivae are clear without exudates or hemorrhage. Sclera is non-icteric. Eyelids are normal in appearance without swelling or lesions. Hearing intact. Nares are patent bilaterally. Moist mucous membranes.   Cardiovascular: heart rate and rhythm are normal.   Extremities:  no swelling or erythema  Neurological: no focal deficits; gait steady  Psychiatric: appropriate mood and affect; good judgment and insight          ASSESSMENT AND PLAN  Assessment/Plan   Diagnoses and all orders for this visit:  Palpitations  -Stable; no complaints; continue current regimen with Toprol XL 25mg daily           RTC: 1 year      Thank you for allowing me to participate in the care of this patient. Please reach me out if you have any questions or if you need any clarifications regarding the patient's care.    Tracey Weber, MELVINA, APRN, FNP-C  Division of Cardiovascular Medicine  [facility]  [facility]

## 2024-10-18 ENCOUNTER — PHARMACY VISIT (OUTPATIENT)
Dept: PHARMACY | Facility: CLINIC | Age: 24
End: 2024-10-18
Payer: MEDICARE

## 2024-10-22 ENCOUNTER — PATIENT MESSAGE (OUTPATIENT)
Dept: OBSTETRICS AND GYNECOLOGY | Facility: CLINIC | Age: 24
End: 2024-10-22
Payer: COMMERCIAL

## 2024-10-22 DIAGNOSIS — E66.01 CLASS 3 SEVERE OBESITY DUE TO EXCESS CALORIES WITHOUT SERIOUS COMORBIDITY WITH BODY MASS INDEX (BMI) OF 40.0 TO 44.9 IN ADULT: ICD-10-CM

## 2024-10-22 DIAGNOSIS — E66.813 CLASS 3 SEVERE OBESITY DUE TO EXCESS CALORIES WITHOUT SERIOUS COMORBIDITY WITH BODY MASS INDEX (BMI) OF 40.0 TO 44.9 IN ADULT: ICD-10-CM

## 2024-10-25 ENCOUNTER — OFFICE VISIT (OUTPATIENT)
Age: 24
End: 2024-10-25
Payer: COMMERCIAL

## 2024-10-25 VITALS
DIASTOLIC BLOOD PRESSURE: 80 MMHG | HEART RATE: 96 BPM | WEIGHT: 257.6 LBS | BODY MASS INDEX: 42.92 KG/M2 | OXYGEN SATURATION: 98 % | HEIGHT: 65 IN | SYSTOLIC BLOOD PRESSURE: 122 MMHG

## 2024-10-25 DIAGNOSIS — E66.01 CLASS 3 SEVERE OBESITY DUE TO EXCESS CALORIES WITHOUT SERIOUS COMORBIDITY WITH BODY MASS INDEX (BMI) OF 40.0 TO 44.9 IN ADULT: Primary | ICD-10-CM

## 2024-10-25 DIAGNOSIS — E66.813 CLASS 3 SEVERE OBESITY DUE TO EXCESS CALORIES WITHOUT SERIOUS COMORBIDITY WITH BODY MASS INDEX (BMI) OF 40.0 TO 44.9 IN ADULT: Primary | ICD-10-CM

## 2024-10-25 DIAGNOSIS — E28.2 POLYCYSTIC OVARIES: ICD-10-CM

## 2024-10-25 PROBLEM — D68.2 FACTOR V DEFICIENCY (MULTI): Status: ACTIVE | Noted: 2024-10-25

## 2024-10-25 PROCEDURE — 99213 OFFICE O/P EST LOW 20 MIN: CPT | Performed by: STUDENT IN AN ORGANIZED HEALTH CARE EDUCATION/TRAINING PROGRAM

## 2024-10-25 NOTE — PROGRESS NOTES
"Subjective:  Louise Santoro is a 23 y.o. female who presents to clinic today for MEDICATION CHECK       She notes that she has been feeling good. Her cramping has resolved. She has not had any issues. She notes liking not needing to take something daily.    She started zepbound in July and has been feeling \"normal\". On the 2.5mg she did not noticed food noise as much and diminished appetite. She notes that she felt unwell when eating heavy foods and sweets. She felt like she plateau'd.        Review of Systems    Assessment/Plan:  Louise Santoro is a 23 y.o. female with a history of inappropriate sinus tachycardia, factor V Leiden deficiency, obesity, PCOS, anxiety and depression who presents to clinic today to address the following issues:   1. Class 3 severe obesity due to excess calories without serious comorbidity with body mass index (BMI) of 40.0 to 44.9 in adult        2. Polycystic ovaries          Louise continues to have weight loss on Mounjaro.  We did recently earlier this week increase dose from 5 mg to 7.5 mg as it seems like she had a plateau.  We discussed intuitive eating, symptomatic management and a goal of a body that feels healthy and strong.  Additionally we discussed that if she were to become pregnant which is very unlikely since she has an IUD this medication would need to be stopped.  Problem List Items Addressed This Visit       Polycystic ovaries    Class 3 severe obesity due to excess calories without serious comorbidity with body mass index (BMI) of 45.0 to 49.9 in adult - Primary       There are no Patient Instructions on file for this visit.    Follow up: 3 months or sooner as needed    Return precautions discussed.  An After Visit Summary was given to the patient.  All questions were answered and patient in agreement with plan.    Objective:  /80   Pulse 96   Ht 1.651 m (5' 5\")   Wt 117 kg (257 lb 9.6 oz)   LMP 09/10/2024 (Approximate)   SpO2 98%   BMI 42.87 " kg/m²     Physical Exam  Vitals and nursing note reviewed.   Constitutional:       General: She is not in acute distress.     Appearance: Normal appearance. She is obese.   HENT:      Head: Normocephalic and atraumatic.      Mouth/Throat:      Mouth: Mucous membranes are moist.   Eyes:      General: No scleral icterus.        Right eye: No discharge.         Left eye: No discharge.      Extraocular Movements: Extraocular movements intact.      Conjunctiva/sclera: Conjunctivae normal.   Pulmonary:      Effort: No respiratory distress.   Skin:     General: Skin is warm and dry.   Neurological:      General: No focal deficit present.      Mental Status: She is alert and oriented to person, place, and time.   Psychiatric:         Attention and Perception: Attention normal.         Mood and Affect: Mood normal.         Speech: Speech normal.         Behavior: Behavior normal.         Cognition and Memory: Cognition and memory normal.         Judgment: Judgment normal.         I spent 24 minutes in total time for this visit including all related clinical activities before, during, and after the visit excluding other billable activities/procedure time.     Sabrina Davey MD

## 2024-11-04 ENCOUNTER — APPOINTMENT (OUTPATIENT)
Dept: RADIOLOGY | Facility: HOSPITAL | Age: 24
End: 2024-11-04
Payer: COMMERCIAL

## 2024-11-04 ENCOUNTER — HOSPITAL ENCOUNTER (EMERGENCY)
Facility: HOSPITAL | Age: 24
Discharge: HOME | End: 2024-11-04
Payer: COMMERCIAL

## 2024-11-04 VITALS
TEMPERATURE: 98 F | HEART RATE: 75 BPM | DIASTOLIC BLOOD PRESSURE: 79 MMHG | SYSTOLIC BLOOD PRESSURE: 118 MMHG | RESPIRATION RATE: 17 BRPM | BODY MASS INDEX: 42.77 KG/M2 | WEIGHT: 257 LBS | OXYGEN SATURATION: 98 %

## 2024-11-04 DIAGNOSIS — B34.9 VIRAL ILLNESS: ICD-10-CM

## 2024-11-04 DIAGNOSIS — R11.2 NAUSEA VOMITING AND DIARRHEA: Primary | ICD-10-CM

## 2024-11-04 DIAGNOSIS — R19.7 NAUSEA VOMITING AND DIARRHEA: Primary | ICD-10-CM

## 2024-11-04 LAB
FLUAV RNA RESP QL NAA+PROBE: NOT DETECTED
FLUBV RNA RESP QL NAA+PROBE: NOT DETECTED
SARS-COV-2 RNA RESP QL NAA+PROBE: NOT DETECTED

## 2024-11-04 PROCEDURE — 2500000005 HC RX 250 GENERAL PHARMACY W/O HCPCS: Performed by: PHYSICIAN ASSISTANT

## 2024-11-04 PROCEDURE — 71046 X-RAY EXAM CHEST 2 VIEWS: CPT | Mod: FOREIGN READ | Performed by: RADIOLOGY

## 2024-11-04 PROCEDURE — 87636 SARSCOV2 & INF A&B AMP PRB: CPT | Performed by: PHYSICIAN ASSISTANT

## 2024-11-04 PROCEDURE — 71046 X-RAY EXAM CHEST 2 VIEWS: CPT

## 2024-11-04 PROCEDURE — 99283 EMERGENCY DEPT VISIT LOW MDM: CPT

## 2024-11-04 RX ORDER — ONDANSETRON 4 MG/1
4 TABLET, ORALLY DISINTEGRATING ORAL ONCE
Status: COMPLETED | OUTPATIENT
Start: 2024-11-04 | End: 2024-11-04

## 2024-11-04 RX ORDER — BISMUTH SUBSALICYLATE 262 MG
1 TABLET,CHEWABLE ORAL DAILY
COMMUNITY

## 2024-11-04 RX ORDER — ONDANSETRON 4 MG/1
4 TABLET, ORALLY DISINTEGRATING ORAL EVERY 8 HOURS PRN
Qty: 9 TABLET | Refills: 0 | Status: SHIPPED | OUTPATIENT
Start: 2024-11-04 | End: 2024-11-08

## 2024-11-04 ASSESSMENT — ENCOUNTER SYMPTOMS
PALPITATIONS: 0
SORE THROAT: 0
VOMITING: 1
ARTHRALGIAS: 0
COLOR CHANGE: 0
DIARRHEA: 1
SEIZURES: 0
COUGH: 0
NAUSEA: 1
EYE PAIN: 0
RHINORRHEA: 1
BACK PAIN: 0
CHILLS: 0
SHORTNESS OF BREATH: 0
ABDOMINAL PAIN: 0
FEVER: 0
HEMATURIA: 0
DYSURIA: 0

## 2024-11-04 ASSESSMENT — COLUMBIA-SUICIDE SEVERITY RATING SCALE - C-SSRS
6. HAVE YOU EVER DONE ANYTHING, STARTED TO DO ANYTHING, OR PREPARED TO DO ANYTHING TO END YOUR LIFE?: NO
1. IN THE PAST MONTH, HAVE YOU WISHED YOU WERE DEAD OR WISHED YOU COULD GO TO SLEEP AND NOT WAKE UP?: NO
2. HAVE YOU ACTUALLY HAD ANY THOUGHTS OF KILLING YOURSELF?: NO

## 2024-11-05 ENCOUNTER — PHARMACY VISIT (OUTPATIENT)
Dept: PHARMACY | Facility: CLINIC | Age: 24
End: 2024-11-05
Payer: MEDICARE

## 2024-11-05 PROCEDURE — RXMED WILLOW AMBULATORY MEDICATION CHARGE

## 2024-11-05 NOTE — ED PROVIDER NOTES
Patient is a 23-year-old female who presents to the emergency room with a chief complaint of flulike symptoms.  Patient states that her symptoms started yesterday.  She reported nausea and vomiting.  She also developed diarrhea.  In addition she reports upper respiratory symptoms of rhinorrhea, congestion, and a mild cough.  She states that she has a history of asthma and is noticed some wheezing in her chest.  She denies any fever.  She reports that her boyfriend is here in the emergency department with the same symptoms.           Review of Systems   Constitutional:  Negative for chills and fever.   HENT:  Positive for rhinorrhea. Negative for ear pain and sore throat.    Eyes:  Negative for pain and visual disturbance.   Respiratory:  Negative for cough and shortness of breath.    Cardiovascular:  Negative for chest pain and palpitations.   Gastrointestinal:  Positive for diarrhea, nausea and vomiting. Negative for abdominal pain.   Genitourinary:  Negative for dysuria and hematuria.   Musculoskeletal:  Negative for arthralgias and back pain.   Skin:  Negative for color change and rash.   Neurological:  Negative for seizures and syncope.   All other systems reviewed and are negative.       Physical Exam  Vitals and nursing note reviewed.   Constitutional:       General: She is not in acute distress.     Appearance: She is well-developed. She is not ill-appearing.   HENT:      Head: Normocephalic and atraumatic.      Right Ear: Tympanic membrane, ear canal and external ear normal.      Left Ear: Tympanic membrane, ear canal and external ear normal.      Nose: Rhinorrhea present.      Mouth/Throat:      Mouth: Mucous membranes are moist.   Eyes:      Conjunctiva/sclera: Conjunctivae normal.   Cardiovascular:      Rate and Rhythm: Normal rate and regular rhythm.      Heart sounds: No murmur heard.  Pulmonary:      Effort: Pulmonary effort is normal. No respiratory distress.      Breath sounds: Normal breath sounds.  No stridor. No wheezing, rhonchi or rales.   Chest:      Chest wall: No tenderness.   Abdominal:      General: There is no distension.      Palpations: Abdomen is soft. There is no mass.      Tenderness: There is no abdominal tenderness. There is no guarding or rebound.      Hernia: No hernia is present.   Musculoskeletal:         General: No swelling.      Cervical back: Normal range of motion and neck supple. No rigidity.   Skin:     General: Skin is warm and dry.      Capillary Refill: Capillary refill takes less than 2 seconds.   Neurological:      General: No focal deficit present.      Mental Status: She is alert and oriented to person, place, and time.   Psychiatric:         Mood and Affect: Mood normal.          Labs Reviewed   INFLUENZA A AND B PCR - Normal       Result Value    Flu A Result Not Detected      Flu B Result Not Detected      Narrative:     This assay is an in vitro diagnostic multiplex nucleic acid amplification test for the detection and discrimination of Influenza A & B from nasopharyngeal specimens, and has been validated for use at [facility]. Negative results do not preclude Influenza A/B infections, and should not be used as the sole basis for diagnosis, treatment, or other management decisions. If Influenza A/B and RSV PCR results are negative, testing for Parainfluenza virus, Adenovirus and Metapneumovirus is routinely performed for [facility] pediatric oncology and intensive care inpatients, and is available on other patients by placing an add-on request.   SARS-COV-2 PCR - Normal    Coronavirus 2019, PCR Not Detected      Narrative:     This assay has received FDA Emergency Use Authorization (EUA) and is only authorized for the duration of time that circumstances exist to justify the authorization of the emergency use of in vitro diagnostic tests for the detection of SARS-CoV-2 virus and/or diagnosis of COVID-19 infection under section 564(b)(1) of the Act, 21  U.S.C. 360bbb-3(b)(1). This assay is an in vitro diagnostic nucleic acid amplification test for the qualitative detection of SARS-CoV-2 from nasopharyngeal specimens and has been validated for use at [facility]. Negative results do not preclude COVID-19 infections and should not be used as the sole basis for diagnosis, treatment, or other management decisions.          XR chest 2 views   Final Result   Normal heart size with no radiographic signs of active pulmonary   parenchymal infiltration.  Findings similar to the previous single   view chest.   Signed by Geni Martinez DO           Procedures     Medical Decision Making  Patient is a 23-year-old female who presents with flulike symptoms of nausea, vomiting, diarrhea, upper respiratory symptoms with onset yesterday. Boyfriend has similar symptoms and is also in the emergency department.  He is afebrile and nontoxic-appearing.  She is tolerating p.o. fluids.  COVID, influenza are negative.  Chest x-ray shows no acute process.  Suspect viral process, patient will be treated symptomatically with recommended follow-up with PCP and return for any new or worsening symptoms.    DDX includes but not limited to: viral illness, covid, influenza, gastroenteritis, acute bronchitis, pneumonia    Amount and/or Complexity of Data Reviewed  Labs: ordered. Decision-making details documented in ED Course.  Radiology: ordered and independent interpretation performed. Decision-making details documented in ED Course.     Details: 2 view chest x-ray, shows no acute process per my interpretation    Risk  Prescription drug management.         Diagnoses as of 11/04/24 2030   Nausea vomiting and diarrhea   Viral illness                    Roma Alicea PA-C  11/04/24 2030

## 2024-11-26 PROCEDURE — RXMED WILLOW AMBULATORY MEDICATION CHARGE

## 2024-12-10 ENCOUNTER — APPOINTMENT (OUTPATIENT)
Age: 24
End: 2024-12-10
Payer: COMMERCIAL

## 2024-12-14 ENCOUNTER — OFFICE VISIT (OUTPATIENT)
Dept: URGENT CARE | Facility: CLINIC | Age: 24
End: 2024-12-14
Payer: COMMERCIAL

## 2024-12-14 ENCOUNTER — PHARMACY VISIT (OUTPATIENT)
Dept: PHARMACY | Facility: CLINIC | Age: 24
End: 2024-12-14
Payer: COMMERCIAL

## 2024-12-14 VITALS
HEIGHT: 66 IN | HEART RATE: 102 BPM | RESPIRATION RATE: 18 BRPM | TEMPERATURE: 98.6 F | WEIGHT: 257 LBS | DIASTOLIC BLOOD PRESSURE: 72 MMHG | SYSTOLIC BLOOD PRESSURE: 109 MMHG | OXYGEN SATURATION: 98 % | BODY MASS INDEX: 41.3 KG/M2

## 2024-12-14 DIAGNOSIS — H60.11 CELLULITIS OF HELIX OF RIGHT EAR: Primary | ICD-10-CM

## 2024-12-14 PROCEDURE — RXMED WILLOW AMBULATORY MEDICATION CHARGE

## 2024-12-14 PROCEDURE — 99213 OFFICE O/P EST LOW 20 MIN: CPT | Performed by: NURSE PRACTITIONER

## 2024-12-14 RX ORDER — SULFAMETHOXAZOLE AND TRIMETHOPRIM 800; 160 MG/1; MG/1
1 TABLET ORAL 2 TIMES DAILY
Qty: 14 TABLET | Refills: 0 | Status: SHIPPED | OUTPATIENT
Start: 2024-12-14 | End: 2024-12-21

## 2024-12-14 RX ORDER — MUPIROCIN CALCIUM 20 MG/G
CREAM TOPICAL 3 TIMES DAILY
Qty: 30 G | Refills: 0 | Status: SHIPPED | OUTPATIENT
Start: 2024-12-14 | End: 2024-12-24

## 2024-12-14 NOTE — PROGRESS NOTES
[facility]   NICO NOTE:      Name: Louise Santoro, 24 y.o.    CSN:[id number]   MRN:[id number]    PCP: Sabrina Davey MD    ALL:    Allergies   Allergen Reactions    Azithromycin Unknown    Keflex [Cephalexin] Hives       History:    Chief Complaint: Skin Problem (Pt pierced ear at home on Monday and right ear has got red and swollen. Pt started feeling her right side of her face flushed and has felt dizzy.)    Encounter Date: 2024      HPI: The history was obtained from the patient. Louise is a 24 y.o. female, who presents with a chief complaint of Skin Problem (Pt pierced ear at home on Monday and right ear has got red and swollen. Pt started feeling her right side of her face flushed and has felt dizzy.) patient pierced bilateral ears Monday evening from a sterile kit from [facility], states right-sided injection hurt more almost immediately but over the last several days has become more painful.  She did remove the earring to the right ear and noted some scant purulent drainage with increasing redness and warmth going into the face.  She did experience some mild dizziness which is not uncommon for her with anxiety.  Utilized her as needed BuSpar and symptoms have improved.    PMHx:    Past Medical History:   Diagnosis Date    , missed ([facility]) 2023    Abscess 2023    Anxiety     Asthma     Depression     Lactose intolerance, unspecified     Lactose intolerance    Localized swelling, mass and lump, unspecified upper limb 2022    Axillary lump    Pain in right lower leg 2022    Right calf pain    PCOS (polycystic ovarian syndrome)     Personal history of other diseases of the respiratory system     History of asthma              Current Outpatient Medications   Medication Sig Dispense Refill    albuterol (Ventolin HFA) 90 mcg/actuation inhaler Inhale 2 puffs every 6 hours if needed for wheezing or shortness of breath. 6.7 g 5    busPIRone (Buspar) 5 mg tablet  Take 1 tablet (5 mg) by mouth 3 times a day as needed (anxiety). 90 tablet 11    metoprolol succinate XL (Toprol-XL) 25 mg 24 hr tablet Take 1 tablet (25 mg) by mouth once daily. Do not crush or chew. 90 tablet 3    multivitamin tablet Take 1 tablet by mouth once daily.      sertraline (Zoloft) 100 mg tablet Take 1 tablet (100 mg) by mouth once daily. 30 tablet 11    ASHWAGANDHA EXTRACT ORAL Take 1 tablet by mouth once daily. (Patient not taking: Reported on 12/14/2024)      CALC CARB-MAG OX-D3-ZINC GLUC ORAL Take 1 tablet by mouth once daily. (Patient not taking: Reported on 12/14/2024)      mupirocin (Bactroban) 2 % cream Apply topically 3 times a day for 10 days. 30 g 0    sulfamethoxazole-trimethoprim (Bactrim DS) 800-160 mg tablet Take 1 tablet by mouth 2 times a day for 7 days. 14 tablet 0    tirzepatide, weight loss, (Zepbound) 7.5 mg/0.5 mL injection Inject 7.5 mg under the skin every 7 days. (Patient not taking: Reported on 12/14/2024) 4 each 2     No current facility-administered medications for this visit.         PMSx:    Past Surgical History:   Procedure Laterality Date    DILATION AND CURETTAGE OF UTERUS  2023    OTHER SURGICAL HISTORY  04/13/2021    [place] tooth extraction    OTHER SURGICAL HISTORY  04/06/2022    Tonsillectomy       Fam Hx:   Family History   Problem Relation Name Age of Onset    Other (diabetes mellitus due to underlying condition with hyperglycemia unspecified whether long term insulin use) Mother      Kidney cancer Mother      Other (tonsil cancer) Mother      Thyroid disease Mother      Other (mental problems) Mother      Hypertension Mother      Anesthesia problems Mother      Other (factor V inhibitor disorder) Father      Hypertension Father      Blood Disorder Father      Thyroid disease Sister      Other (mental problems) Sister      Blood Disorder Sister      Anesthesia problems Sister      Other (mental problems) Brother         SOC. Hx:     Social History      Socioeconomic History    Marital status: Single     Spouse name: Not on file    Number of children: Not on file    Years of education: Not on file    Highest education level: Not on file   Occupational History    Not on file   Tobacco Use    Smoking status: Never    Smokeless tobacco: Former    Tobacco comments:     Vapes on and off   Vaping Use    Vaping status: Every Day    Substances: Nicotine    Devices: Disposable   Substance and Sexual Activity    Alcohol use: Not Currently    Drug use: Never    Sexual activity: Yes     Partners: Male     Birth control/protection: OCP   Other Topics Concern    Not on file   Social History Narrative    Not on file     Social Drivers of Health     Financial Resource Strain: Not on file   Food Insecurity: Not on file   Transportation Needs: Not on file   Physical Activity: Not on file   Stress: Not on file   Social Connections: Not on file   Intimate Partner Violence: Not on file   Housing Stability: Not on file         Vitals:    12/14/24 1455   BP: 109/72   Pulse: 102   Resp: 18   Temp: 37 °C (98.6 °F)   SpO2: 98%     117 kg (257 lb)          Physical Exam  Vitals and nursing note reviewed.   Constitutional:       Appearance: Normal appearance.   HENT:      Head: Normocephalic and atraumatic.      Right Ear: Hearing, tympanic membrane and ear canal normal.      Left Ear: Hearing, tympanic membrane and ear canal normal.      Ears:        Mouth/Throat:      Mouth: Mucous membranes are moist.      Pharynx: Oropharynx is clear.   Eyes:      Pupils: Pupils are equal, round, and reactive to light.   Cardiovascular:      Rate and Rhythm: Normal rate and regular rhythm.      Pulses: Normal pulses.      Heart sounds: Normal heart sounds.   Pulmonary:      Effort: Pulmonary effort is normal.      Breath sounds: Normal breath sounds.   Abdominal:      General: Abdomen is flat. Bowel sounds are normal.      Palpations: Abdomen is soft.   Musculoskeletal:         General: Normal range  of motion.      Cervical back: Normal range of motion.   Skin:     General: Skin is warm and dry.      Capillary Refill: Capillary refill takes less than 2 seconds.   Neurological:      Mental Status: She is alert and oriented to person, place, and time.         ____________________________________________________________________    I did personally review Louise's past medical history, surgical history, social history, as well as family history (when relevant).  In this case, I also oversaw the her drug management by reviewing her medication list, allergy list, as well as the medications that I prescribed during the UC course and/or recommended as an out-patient (including possible OTC medications such as acetaminophen, NSAIDs , etc).    After reviewing the items above, I did look at previous medical documentation, such as recent hospitalizations, office visits, and/or recent consultations with PCP/specialist.                          SDOH:   Another factor that I considered in Louise's care was her Social Determinants of Health (SDOH). During this UC encounter, she did not have social determinants of health. Those SDOH influencing Louise's care are: none      _____________________________________________________________________      UC COURSE/MEDICAL DECISION MAKING:    Louise is a 24 y.o., who presents with a working diagnosis of   1. Cellulitis of helix of right ear        Louise was seen today for skin problem.  Diagnoses and all orders for this visit:  Cellulitis of helix of right ear (Primary)  -     sulfamethoxazole-trimethoprim (Bactrim DS) 800-160 mg tablet; Take 1 tablet by mouth 2 times a day for 7 days.  -     mupirocin (Bactroban) 2 % cream; Apply topically 3 times a day for 10 days.         Plan of care reviewed with patient.  If symptoms change and/or worsen will follow-up with primary care provider, return to urgent care, or go to the nearest emergency department for further evaluation.  Patient states  understanding and is agreeable with plan of care.      KENROY Higuera, MELVINA   Advanced Practice Provider  [facility]    Please note: While the patient may or may not have received printed discharge paperwork, all relevant medical findings, test results, and treatment details are accessible through the electronic medical record system. The patient is encouraged to review their chart via the patient portal for comprehensive information and follow-up instructions.

## 2025-01-02 ENCOUNTER — LAB (OUTPATIENT)
Facility: LAB | Age: 25
End: 2025-01-02
Payer: COMMERCIAL

## 2025-01-02 ENCOUNTER — OFFICE VISIT (OUTPATIENT)
Age: 25
End: 2025-01-02
Payer: COMMERCIAL

## 2025-01-02 VITALS
HEART RATE: 93 BPM | BODY MASS INDEX: 44.28 KG/M2 | DIASTOLIC BLOOD PRESSURE: 86 MMHG | OXYGEN SATURATION: 99 % | WEIGHT: 265.8 LBS | HEIGHT: 65 IN | SYSTOLIC BLOOD PRESSURE: 126 MMHG

## 2025-01-02 DIAGNOSIS — I47.11 INAPPROPRIATE SINUS TACHYCARDIA (CMS-HCC): ICD-10-CM

## 2025-01-02 DIAGNOSIS — R53.83 OTHER FATIGUE: Primary | ICD-10-CM

## 2025-01-02 DIAGNOSIS — R53.83 OTHER FATIGUE: ICD-10-CM

## 2025-01-02 DIAGNOSIS — R00.2 PALPITATIONS: ICD-10-CM

## 2025-01-02 LAB
25(OH)D3 SERPL-MCNC: 29 NG/ML (ref 30–100)
ALBUMIN SERPL BCP-MCNC: 4.6 G/DL (ref 3.4–5)
ALP SERPL-CCNC: 70 U/L (ref 33–110)
ALT SERPL W P-5'-P-CCNC: 14 U/L (ref 7–45)
ANION GAP SERPL CALC-SCNC: 11 MMOL/L (ref 10–20)
AST SERPL W P-5'-P-CCNC: 16 U/L (ref 9–39)
BASOPHILS # BLD AUTO: 0.03 X10*3/UL (ref 0–0.1)
BASOPHILS NFR BLD AUTO: 0.4 %
BILIRUB SERPL-MCNC: 0.3 MG/DL (ref 0–1.2)
BUN SERPL-MCNC: 11 MG/DL (ref 6–23)
CALCIUM SERPL-MCNC: 9.8 MG/DL (ref 8.6–10.3)
CHLORIDE SERPL-SCNC: 103 MMOL/L (ref 98–107)
CO2 SERPL-SCNC: 29 MMOL/L (ref 21–32)
CREAT SERPL-MCNC: 0.88 MG/DL (ref 0.5–1.05)
EGFRCR SERPLBLD CKD-EPI 2021: >90 ML/MIN/1.73M*2
EOSINOPHIL # BLD AUTO: 0.03 X10*3/UL (ref 0–0.7)
EOSINOPHIL NFR BLD AUTO: 0.4 %
ERYTHROCYTE [DISTWIDTH] IN BLOOD BY AUTOMATED COUNT: 11.7 % (ref 11.5–14.5)
GLUCOSE SERPL-MCNC: 95 MG/DL (ref 74–99)
HCT VFR BLD AUTO: 45.6 % (ref 36–46)
HGB BLD-MCNC: 15.1 G/DL (ref 12–16)
IMM GRANULOCYTES # BLD AUTO: 0.02 X10*3/UL (ref 0–0.7)
IMM GRANULOCYTES NFR BLD AUTO: 0.3 % (ref 0–0.9)
LYMPHOCYTES # BLD AUTO: 2.53 X10*3/UL (ref 1.2–4.8)
LYMPHOCYTES NFR BLD AUTO: 36.6 %
MCH RBC QN AUTO: 28.3 PG (ref 26–34)
MCHC RBC AUTO-ENTMCNC: 33.1 G/DL (ref 32–36)
MCV RBC AUTO: 85 FL (ref 80–100)
MONOCYTES # BLD AUTO: 0.25 X10*3/UL (ref 0.1–1)
MONOCYTES NFR BLD AUTO: 3.6 %
NEUTROPHILS # BLD AUTO: 4.05 X10*3/UL (ref 1.2–7.7)
NEUTROPHILS NFR BLD AUTO: 58.7 %
NRBC BLD-RTO: 0 /100 WBCS (ref 0–0)
PLATELET # BLD AUTO: 251 X10*3/UL (ref 150–450)
POTASSIUM SERPL-SCNC: 4.2 MMOL/L (ref 3.5–5.3)
PROT SERPL-MCNC: 7.3 G/DL (ref 6.4–8.2)
RBC # BLD AUTO: 5.34 X10*6/UL (ref 4–5.2)
SODIUM SERPL-SCNC: 139 MMOL/L (ref 136–145)
TSH SERPL-ACNC: 1.34 MIU/L (ref 0.44–3.98)
VIT B12 SERPL-MCNC: 429 PG/ML (ref 211–911)
WBC # BLD AUTO: 6.9 X10*3/UL (ref 4.4–11.3)

## 2025-01-02 PROCEDURE — 80053 COMPREHEN METABOLIC PANEL: CPT

## 2025-01-02 PROCEDURE — 3008F BODY MASS INDEX DOCD: CPT | Performed by: NURSE PRACTITIONER

## 2025-01-02 PROCEDURE — 85025 COMPLETE CBC W/AUTO DIFF WBC: CPT

## 2025-01-02 PROCEDURE — 1036F TOBACCO NON-USER: CPT | Performed by: NURSE PRACTITIONER

## 2025-01-02 PROCEDURE — 99214 OFFICE O/P EST MOD 30 MIN: CPT | Performed by: NURSE PRACTITIONER

## 2025-01-02 PROCEDURE — 82607 VITAMIN B-12: CPT

## 2025-01-02 PROCEDURE — 82306 VITAMIN D 25 HYDROXY: CPT

## 2025-01-02 PROCEDURE — 84443 ASSAY THYROID STIM HORMONE: CPT

## 2025-01-02 RX ORDER — METOPROLOL SUCCINATE 50 MG/1
50 TABLET, EXTENDED RELEASE ORAL DAILY
Qty: 90 TABLET | Refills: 3 | Status: SHIPPED | OUTPATIENT
Start: 2025-01-02 | End: 2026-01-02

## 2025-01-02 ASSESSMENT — ENCOUNTER SYMPTOMS
FATIGUE: 1
PALPITATIONS: 1
DIZZINESS: 1
COLOR CHANGE: 1
WEAKNESS: 1
LIGHT-HEADEDNESS: 1
SHORTNESS OF BREATH: 1
HEADACHES: 1

## 2025-01-02 ASSESSMENT — PATIENT HEALTH QUESTIONNAIRE - PHQ9
2. FEELING DOWN, DEPRESSED OR HOPELESS: NOT AT ALL
1. LITTLE INTEREST OR PLEASURE IN DOING THINGS: NOT AT ALL
SUM OF ALL RESPONSES TO PHQ9 QUESTIONS 1 AND 2: 0

## 2025-01-02 NOTE — PROGRESS NOTES
Subjective   Patient ID: Louise Santoro is a 24 y.o. female who presents for Dizziness (Happening when standing or sitting and then changing position, seeing spots, hot flashes, no syncope but multiple near syncopal episodes. Patient is established with cardiology).    Louise comes to the office for concerns of ongoing dizziness and lightheadedness when standing.  She also notices the symptoms when she tries to change her position rapidly.  The symptoms have been going on since the summer.  She was seen by cardiology and started on metoprolol which she initially felt was helpful but within the past month the symptoms have returned.  She has tried to remain hydrated drinking Liquid IV and getting plenty of salt in her diet.  This has helped to some degree.  She feels when she is changing positions her symptoms are worse.  Feels like her heart is skipping beat and beating hard. + Shortness of breath & discoloration of her feet and hands if she stands for long periods of time. Over summer started with pre-syncopal episodes and with chging positions and feels like sx are worse  + dizzy spells  + heart skipping beats & beating hard  + HA  + more easily out of breath   Sx worse in AM in the evening  Addendum added at 1925H-patient contacted regarding her blood work.  Those results were reviewed with her this evening.  We will increase her metoprolol XL to 50 mg daily.  She does have an upcoming appointment on 1/23/25 w/ JÚNIOR.  We will reevaluate at that time on how her symptoms are doing.  Recommend starting OTC vitamin D3 800-1,000IU daily. Taking MVI daily.         Review of Systems   Constitutional:  Positive for fatigue.   Respiratory:  Positive for shortness of breath.    Cardiovascular:  Positive for chest pain and palpitations.   Skin:  Positive for color change.   Neurological:  Positive for dizziness, weakness, light-headedness and headaches. Negative for syncope.       Objective   /86   Pulse 93   Ht 1.651  "m (5' 5\")   Wt 121 kg (265 lb 12.8 oz)   SpO2 99%   BMI 44.23 kg/m²     Physical Exam  Vitals reviewed.   Constitutional:       General: She is not in acute distress.     Appearance: She is obese. She is not ill-appearing.   Neck:      Vascular: No carotid bruit.   Cardiovascular:      Rate and Rhythm: Normal rate and regular rhythm.      Heart sounds: Normal heart sounds. No murmur heard.  Pulmonary:      Effort: Pulmonary effort is normal.      Breath sounds: Normal breath sounds.   Abdominal:      General: Abdomen is protuberant. Bowel sounds are decreased.      Palpations: Abdomen is soft.      Tenderness: There is no abdominal tenderness.   Musculoskeletal:      Right lower leg: No edema.      Left lower leg: No edema.   Lymphadenopathy:      Cervical: No cervical adenopathy.   Skin:     General: Skin is warm and dry.      Coloration: Skin is not ashen, cyanotic, jaundiced or mottled.   Neurological:      Mental Status: She is alert.         Assessment/Plan   Problem List Items Addressed This Visit             ICD-10-CM    Palpitations R00.2    Relevant Medications    metoprolol succinate XL (Toprol-XL) 50 mg 24 hr tablet    Inappropriate sinus tachycardia (CMS-HCC) I47.11    Relevant Medications    metoprolol succinate XL (Toprol-XL) 50 mg 24 hr tablet    Other fatigue - Primary R53.83    Relevant Orders    CBC and Auto Differential (Completed)    Comprehensive Metabolic Panel (Completed)    TSH with reflex to Free T4 if abnormal (Completed)    Vitamin B12 (Completed)    Vitamin D 25-Hydroxy,Total (for eval of Vitamin D levels) (Completed)          "

## 2025-01-23 ENCOUNTER — APPOINTMENT (OUTPATIENT)
Age: 25
End: 2025-01-23
Payer: COMMERCIAL

## 2025-01-23 VITALS
HEIGHT: 65 IN | WEIGHT: 272.8 LBS | OXYGEN SATURATION: 100 % | BODY MASS INDEX: 45.45 KG/M2 | SYSTOLIC BLOOD PRESSURE: 124 MMHG | HEART RATE: 84 BPM | DIASTOLIC BLOOD PRESSURE: 72 MMHG

## 2025-01-23 DIAGNOSIS — R42 DIZZINESS: ICD-10-CM

## 2025-01-23 DIAGNOSIS — L81.9 HYPERPIGMENTED SKIN LESION: ICD-10-CM

## 2025-01-23 DIAGNOSIS — I47.11 INAPPROPRIATE SINUS TACHYCARDIA (CMS-HCC): Primary | ICD-10-CM

## 2025-01-23 DIAGNOSIS — R00.2 PALPITATIONS: ICD-10-CM

## 2025-01-23 PROCEDURE — 1036F TOBACCO NON-USER: CPT | Performed by: STUDENT IN AN ORGANIZED HEALTH CARE EDUCATION/TRAINING PROGRAM

## 2025-01-23 PROCEDURE — 99214 OFFICE O/P EST MOD 30 MIN: CPT | Performed by: STUDENT IN AN ORGANIZED HEALTH CARE EDUCATION/TRAINING PROGRAM

## 2025-01-23 PROCEDURE — 3008F BODY MASS INDEX DOCD: CPT | Performed by: STUDENT IN AN ORGANIZED HEALTH CARE EDUCATION/TRAINING PROGRAM

## 2025-01-23 PROCEDURE — G2211 COMPLEX E/M VISIT ADD ON: HCPCS | Performed by: STUDENT IN AN ORGANIZED HEALTH CARE EDUCATION/TRAINING PROGRAM

## 2025-01-23 NOTE — PROGRESS NOTES
Subjective:  Louise Santoro is a 24 y.o. female who presents to clinic today for Follow-up (3 MO FU )      She notes that she's having a lot of symptoms of POTS. She does feel like 50mg of toprol XL has been helpful and she's not noticing as much dizziness and as much heart rate variability. She's doing more electrolytes and liquid IV.   She's cutting back on sugar and carbs. She's been doing high protein lower carbohydrate diet. At the cardiology office they had her do orthostatic testing. But never tilt table testing.     She has stopped her zoloft due to not feeling well on higher dose. She had to stop her tirzepitide.     Changing hyperpigmented lesion on right upper inner thigh.     Review of Systems    Assessment/Plan:  Louise Santoro is a 24 y.o. female who presents to clinic today to address the following issues:   1. Inappropriate sinus tachycardia (CMS-HCC)  Autonomic Testing      2. Palpitations  Autonomic Testing      3. Dizziness  Autonomic Testing      4. Hyperpigmented skin lesion  Referral to Dermatology      Inappropriate sinus tachycardia/palpitations/dizziness:  Chronic problem, known to provider  Doing okay on metoprolol 50 mg although still continuing to have some symptoms.  Due to this we will order autonomic testing to further rule out potential POTS.  Additionally discussed lifestyle modifications including increased sodium regular physical activityvAnd continued medications.    Hyperpigmented skin lesion:  Chronic problem, new to provider  Referral to dermatology for potential biopsy  Problem List Items Addressed This Visit       Palpitations    Relevant Orders    Autonomic Testing    Inappropriate sinus tachycardia (CMS-HCC) - Primary    Relevant Orders    Autonomic Testing     Other Visit Diagnoses       Dizziness        Relevant Orders    Autonomic Testing    Hyperpigmented skin lesion        Relevant Orders    Referral to Dermatology            There are no Patient  "Instructions on file for this visit.    Follow up: 2 months    Return precautions discussed.  An After Visit Summary was given to the patient.  All questions were answered and patient in agreement with plan.    Objective:  /72   Pulse 84   Ht 1.651 m (5' 5\")   Wt 124 kg (272 lb 12.8 oz)   SpO2 100%   BMI 45.40 kg/m²     Physical Exam  Vitals and nursing note reviewed.   Constitutional:       General: She is not in acute distress.     Appearance: Normal appearance. She is obese.   HENT:      Head: Normocephalic and atraumatic.      Mouth/Throat:      Mouth: Mucous membranes are moist.   Eyes:      General: No scleral icterus.        Right eye: No discharge.         Left eye: No discharge.      Extraocular Movements: Extraocular movements intact.      Conjunctiva/sclera: Conjunctivae normal.   Cardiovascular:      Rate and Rhythm: Regular rhythm. Tachycardia present.   Pulmonary:      Effort: No respiratory distress.   Skin:     General: Skin is warm and dry.      Comments: 68 mm irregular hyperpigmented lesion on right upper inner thigh   Neurological:      General: No focal deficit present.      Mental Status: She is alert and oriented to person, place, and time.   Psychiatric:         Attention and Perception: Attention normal.         Mood and Affect: Mood normal.         Speech: Speech normal.         Behavior: Behavior normal.         Cognition and Memory: Cognition and memory normal.         Judgment: Judgment normal.         I spent 23 minutes in total time for this visit including all related clinical activities before, during, and after the visit excluding other billable activities/procedure time.     Sabrina Davey MD    "

## 2025-01-24 PROCEDURE — RXMED WILLOW AMBULATORY MEDICATION CHARGE

## 2025-01-28 ENCOUNTER — PHARMACY VISIT (OUTPATIENT)
Dept: PHARMACY | Facility: CLINIC | Age: 25
End: 2025-01-28
Payer: MEDICARE

## 2025-02-04 ENCOUNTER — APPOINTMENT (OUTPATIENT)
Age: 25
End: 2025-02-04
Payer: COMMERCIAL

## 2025-03-12 ENCOUNTER — HOSPITAL ENCOUNTER (OUTPATIENT)
Facility: CLINIC | Age: 25
Discharge: HOME | End: 2025-03-12
Payer: COMMERCIAL

## 2025-03-12 DIAGNOSIS — I47.11 INAPPROPRIATE SINUS TACHYCARDIA (CMS-HCC): ICD-10-CM

## 2025-03-12 DIAGNOSIS — R00.2 PALPITATIONS: ICD-10-CM

## 2025-03-12 DIAGNOSIS — R42 DIZZINESS: ICD-10-CM

## 2025-03-12 PROCEDURE — 95924 ANS PARASYMP & SYMP W/TILT: CPT | Performed by: SPECIALIST

## 2025-03-12 PROCEDURE — 95923 AUTONOMIC NRV SYST FUNJ TEST: CPT | Performed by: SPECIALIST

## 2025-03-27 ENCOUNTER — APPOINTMENT (OUTPATIENT)
Age: 25
End: 2025-03-27
Payer: COMMERCIAL

## 2025-04-07 ENCOUNTER — APPOINTMENT (OUTPATIENT)
Age: 25
End: 2025-04-07
Payer: COMMERCIAL

## 2025-04-19 ENCOUNTER — HOSPITAL ENCOUNTER (EMERGENCY)
Facility: HOSPITAL | Age: 25
Discharge: HOME | End: 2025-04-19
Attending: EMERGENCY MEDICINE
Payer: COMMERCIAL

## 2025-04-19 ENCOUNTER — APPOINTMENT (OUTPATIENT)
Dept: RADIOLOGY | Facility: HOSPITAL | Age: 25
End: 2025-04-19
Payer: COMMERCIAL

## 2025-04-19 VITALS
WEIGHT: 230 LBS | RESPIRATION RATE: 16 BRPM | OXYGEN SATURATION: 98 % | DIASTOLIC BLOOD PRESSURE: 97 MMHG | SYSTOLIC BLOOD PRESSURE: 143 MMHG | BODY MASS INDEX: 38.32 KG/M2 | HEIGHT: 65 IN | HEART RATE: 108 BPM | TEMPERATURE: 98.6 F

## 2025-04-19 DIAGNOSIS — S93.402A SPRAIN OF LEFT ANKLE, INITIAL ENCOUNTER: Primary | ICD-10-CM

## 2025-04-19 PROCEDURE — 73630 X-RAY EXAM OF FOOT: CPT | Mod: LEFT SIDE | Performed by: RADIOLOGY

## 2025-04-19 PROCEDURE — 73610 X-RAY EXAM OF ANKLE: CPT | Mod: LEFT SIDE | Performed by: RADIOLOGY

## 2025-04-19 PROCEDURE — 73630 X-RAY EXAM OF FOOT: CPT | Mod: LT

## 2025-04-19 PROCEDURE — 99284 EMERGENCY DEPT VISIT MOD MDM: CPT | Performed by: EMERGENCY MEDICINE

## 2025-04-19 PROCEDURE — 73610 X-RAY EXAM OF ANKLE: CPT | Mod: LT

## 2025-04-19 ASSESSMENT — PAIN SCALES - GENERAL: PAINLEVEL_OUTOF10: 7

## 2025-04-19 ASSESSMENT — PAIN DESCRIPTION - LOCATION: LOCATION: ANKLE

## 2025-04-19 ASSESSMENT — PAIN - FUNCTIONAL ASSESSMENT: PAIN_FUNCTIONAL_ASSESSMENT: 0-10

## 2025-04-19 ASSESSMENT — PAIN DESCRIPTION - ORIENTATION: ORIENTATION: LEFT

## 2025-04-19 NOTE — ED PROVIDER NOTES
HPI   Chief Complaint   Patient presents with    Ankle Pain     Patient states she tripped down some concrete stairs last night and injured her left ankle       Limitations to History: None    HPI: 24-year-old female presents concern for left ankle and foot pain.  States she tripped down 3 stairs and her foot was hyperextended.  States that she has had increasing pain and swelling throughout the day.  Worse with ambulation.  Denies any numbness or tingling.  Denies any head injury or loss of consciousness.    ------------------------------------------------------------------------------------------------------------------------------------------  Physical Exam:    VS: As documented in the triage note and EMR flowsheet from this visit were reviewed.    Appearance: Alert. cooperative,  in no acute distress.   Skin: Intact,  dry skin, no lesions, rash, petechiae or purpura.   HENT: Normocephalic, atraumatic.   Musculoskeletal: Full range of motion of the left ankle.  Strong palpable DP and PT pulses.  Medial and lateral edema.  Neurological: Sensation grossly intact to the left foot and ankle.  Psychiatric: Appropriate mood and affect.                Patient History   Medical History[1]  Surgical History[2]  Family History[3]  Social History[4]    Physical Exam   ED Triage Vitals [04/19/25 1529]   Temperature Heart Rate Respirations BP   37 °C (98.6 °F) (!) 108 16 (!) 143/97      Pulse Ox Temp Source Heart Rate Source Patient Position   98 % Oral Monitor --      BP Location FiO2 (%)     -- --       Physical Exam      ED Course & MDM   Diagnoses as of 04/19/25 1648   Sprain of left ankle, initial encounter                 No data recorded     Adrian Coma Scale Score: 15 (04/19/25 1531 : Deloris Steiner RN)                           Medical Decision Making  XR ankle left 3+ views   Final Result    No acute osseous abnormality.    Signed by Patel Dwyer MD     XR foot left 3+ views   Final Result    No acute osseous  abnormality.    Signed by Patel Dwyer MD     Medical Decision Making:    Patient appears well nontoxic.  Neurovascularly intact.  Left foot and ankle x-ray negative.  Patient be placed in Ace wrap and given crutches.  Advised on rest, ice, compression, elevation.  Advised on follow-up with primary care.  Stable at time of discharge.    Differential Diagnoses Considered: Left ankle/foot fracture, dislocation, sprain, contusion    Independent Interpretation of Studies:  I independently interpreted: Ankle x-ray shows no acute fracture.  Left foot x-ray shows no acute fracture.    Escalation of Care:  Appropriate for discharge and follow-up with primary care.          Procedure  Procedures       [1]   Past Medical History:  Diagnosis Date    , missed ([facility]) 2023    Abscess 2023    Anxiety     Asthma     Depression     Lactose intolerance, unspecified     Lactose intolerance    Localized swelling, mass and lump, unspecified upper limb 2022    Axillary lump    Pain in right lower leg 2022    Right calf pain    PCOS (polycystic ovarian syndrome)     Personal history of other diseases of the respiratory system     History of asthma   [2]   Past Surgical History:  Procedure Laterality Date    DILATION AND CURETTAGE OF UTERUS      OTHER SURGICAL HISTORY  2021    [place] tooth extraction    OTHER SURGICAL HISTORY  2022    Tonsillectomy   [3]   Family History  Problem Relation Name Age of Onset    Other (diabetes mellitus due to underlying condition with hyperglycemia unspecified whether long term insulin use) Mother      Kidney cancer Mother      Other (tonsil cancer) Mother      Thyroid disease Mother      Other (mental problems) Mother      Hypertension Mother      Anesthesia problems Mother      Other (factor V inhibitor disorder) Father      Hypertension Father      Blood Disorder Father      Thyroid disease Sister      Other (mental problems) Sister      Blood Disorder  Sister      Anesthesia problems Sister      Other (mental problems) Brother     [4]   Social History  Tobacco Use    Smoking status: Never    Smokeless tobacco: Former    Tobacco comments:     Vapes on and off   Vaping Use    Vaping status: Every Day    Substances: Nicotine    Devices: Disposable   Substance Use Topics    Alcohol use: Not Currently    Drug use: Never        Ignacio Skinner DO  04/20/25 1930

## 2025-04-23 ENCOUNTER — OFFICE VISIT (OUTPATIENT)
Facility: CLINIC | Age: 25
End: 2025-04-23
Payer: COMMERCIAL

## 2025-04-23 VITALS
SYSTOLIC BLOOD PRESSURE: 118 MMHG | BODY MASS INDEX: 43.52 KG/M2 | DIASTOLIC BLOOD PRESSURE: 82 MMHG | WEIGHT: 261.2 LBS | HEIGHT: 65 IN

## 2025-04-23 DIAGNOSIS — Z11.3 SCREENING EXAMINATION FOR STI: ICD-10-CM

## 2025-04-23 DIAGNOSIS — Z20.2 EXPOSURE TO CHLAMYDIA: Primary | ICD-10-CM

## 2025-04-23 PROCEDURE — 1036F TOBACCO NON-USER: CPT | Performed by: STUDENT IN AN ORGANIZED HEALTH CARE EDUCATION/TRAINING PROGRAM

## 2025-04-23 PROCEDURE — 3008F BODY MASS INDEX DOCD: CPT | Performed by: STUDENT IN AN ORGANIZED HEALTH CARE EDUCATION/TRAINING PROGRAM

## 2025-04-23 PROCEDURE — 99213 OFFICE O/P EST LOW 20 MIN: CPT | Performed by: STUDENT IN AN ORGANIZED HEALTH CARE EDUCATION/TRAINING PROGRAM

## 2025-04-23 ASSESSMENT — ANXIETY QUESTIONNAIRES
3. WORRYING TOO MUCH ABOUT DIFFERENT THINGS: NOT AT ALL
5. BEING SO RESTLESS THAT IT IS HARD TO SIT STILL: NOT AT ALL
7. FEELING AFRAID AS IF SOMETHING AWFUL MIGHT HAPPEN: NOT AT ALL
6. BECOMING EASILY ANNOYED OR IRRITABLE: NOT AT ALL
4. TROUBLE RELAXING: NOT AT ALL
IF YOU CHECKED OFF ANY PROBLEMS ON THIS QUESTIONNAIRE, HOW DIFFICULT HAVE THESE PROBLEMS MADE IT FOR YOU TO DO YOUR WORK, TAKE CARE OF THINGS AT HOME, OR GET ALONG WITH OTHER PEOPLE: NOT DIFFICULT AT ALL
GAD7 TOTAL SCORE: 0
2. NOT BEING ABLE TO STOP OR CONTROL WORRYING: NOT AT ALL
1. FEELING NERVOUS, ANXIOUS, OR ON EDGE: NOT AT ALL

## 2025-04-23 ASSESSMENT — COLUMBIA-SUICIDE SEVERITY RATING SCALE - C-SSRS
2. HAVE YOU ACTUALLY HAD ANY THOUGHTS OF KILLING YOURSELF?: NO
1. IN THE PAST MONTH, HAVE YOU WISHED YOU WERE DEAD OR WISHED YOU COULD GO TO SLEEP AND NOT WAKE UP?: NO
6. HAVE YOU EVER DONE ANYTHING, STARTED TO DO ANYTHING, OR PREPARED TO DO ANYTHING TO END YOUR LIFE?: NO

## 2025-04-23 NOTE — PROGRESS NOTES
Subjective:  Louise Santoro is a 24 y.o. female who presents to clinic today for sti testing      Patient found out a few days ago that she was exposed to chlamydia through unprotected intercourse.  She has not had any symptoms no itching no other pain, discharge or other issues.  Comes in today for routine STI testing    Review of Systems    Assessment/Plan:  Louise Santoro is a 24 y.o. female  who presents to clinic today to address the following issues:   1. Exposure to chlamydia  HIV 1/2 Antigen/Antibody Screen with Reflex to Confirmation    Hepatitis C Antibody    C. trachomatis / N. gonorrhoeae, Amplified, Urogenital    Syphilis Screen with Reflex    HIV 1/2 Antigen/Antibody Screen with Reflex to Confirmation    Hepatitis C Antibody    Syphilis Screen with Reflex      2. Screening examination for STI  HIV 1/2 Antigen/Antibody Screen with Reflex to Confirmation    Hepatitis C Antibody    C. trachomatis / N. gonorrhoeae, Amplified, Urogenital    Syphilis Screen with Reflex    HIV 1/2 Antigen/Antibody Screen with Reflex to Confirmation    Hepatitis C Antibody    Syphilis Screen with Reflex      No signs of pelvic inflammatory disease on examination today.  We did obtain swab for gonorrhea and chlamydia as well as ordered blood work for HIV, hep C and syphilis.  Discussed with patient that if results are positive will treat with doxycycline.    Problem List Items Addressed This Visit    None  Visit Diagnoses         Exposure to chlamydia    -  Primary    Relevant Orders    HIV 1/2 Antigen/Antibody Screen with Reflex to Confirmation    Hepatitis C Antibody    C. trachomatis / N. gonorrhoeae, Amplified, Urogenital    Syphilis Screen with Reflex      Screening examination for STI        Relevant Orders    HIV 1/2 Antigen/Antibody Screen with Reflex to Confirmation    Hepatitis C Antibody    C. trachomatis / N. gonorrhoeae, Amplified, Urogenital    Syphilis Screen with Reflex            There are no  "Patient Instructions on file for this visit.    Follow up: As needed    Return precautions discussed.  An After Visit Summary was given to the patient.  All questions were answered and patient in agreement with plan.    Objective:  /82   Ht 1.651 m (5' 5\")   Wt 118 kg (261 lb 3.2 oz)   LMP 03/29/2025   BMI 43.47 kg/m²     Physical Exam  Vitals and nursing note reviewed. Exam conducted with a chaperone present (Roxanne).   Constitutional:       General: She is not in acute distress.     Appearance: Normal appearance.   HENT:      Head: Normocephalic and atraumatic.      Mouth/Throat:      Mouth: Mucous membranes are moist.   Eyes:      General: No scleral icterus.        Right eye: No discharge.         Left eye: No discharge.      Extraocular Movements: Extraocular movements intact.      Conjunctiva/sclera: Conjunctivae normal.   Pulmonary:      Effort: No respiratory distress.   Genitourinary:     Pubic Area: No rash or pubic lice.       Shaheed stage (genital): 5.      Vagina: Normal.      Cervix: No cervical motion tenderness, friability or erythema.      Uterus: Normal.             Comments: IUD strings in place  Skin:     General: Skin is warm and dry.   Neurological:      General: No focal deficit present.      Mental Status: She is alert and oriented to person, place, and time.   Psychiatric:         Attention and Perception: Attention normal.         Mood and Affect: Mood normal.         Speech: Speech normal.         Behavior: Behavior normal.         Cognition and Memory: Cognition and memory normal.         Judgment: Judgment normal.         I spent 12 minutes in total time for this visit including all related clinical activities before, during, and after the visit excluding other billable activities/procedure time.     Sabrina Davey MD    "

## 2025-04-24 ENCOUNTER — TELEPHONE (OUTPATIENT)
Facility: CLINIC | Age: 25
End: 2025-04-24
Payer: COMMERCIAL

## 2025-04-24 DIAGNOSIS — A74.9 CHLAMYDIA: Primary | ICD-10-CM

## 2025-04-24 LAB
C TRACH RRNA SPEC QL NAA+PROBE: DETECTED
N GONORRHOEA RRNA SPEC QL NAA+PROBE: NOT DETECTED
QUEST GC CT AMPLIFIED (ALWAYS MESSAGE): ABNORMAL

## 2025-04-24 PROCEDURE — RXMED WILLOW AMBULATORY MEDICATION CHARGE

## 2025-04-24 RX ORDER — DOXYCYCLINE 100 MG/1
100 CAPSULE ORAL 2 TIMES DAILY
Qty: 14 CAPSULE | Refills: 0 | Status: SHIPPED | OUTPATIENT
Start: 2025-04-24 | End: 2025-05-02

## 2025-04-25 ENCOUNTER — PHARMACY VISIT (OUTPATIENT)
Dept: PHARMACY | Facility: CLINIC | Age: 25
End: 2025-04-25
Payer: MEDICARE

## 2025-05-01 LAB
HCV AB SERPL QL IA: NORMAL
HIV 1+2 AB+HIV1 P24 AG SERPL QL IA: NORMAL
T PALLIDUM AB SER QL IA: NEGATIVE

## 2025-05-06 ENCOUNTER — TELEPHONE (OUTPATIENT)
Facility: CLINIC | Age: 25
End: 2025-05-06
Payer: COMMERCIAL

## 2025-05-06 ENCOUNTER — PHARMACY VISIT (OUTPATIENT)
Dept: PHARMACY | Facility: CLINIC | Age: 25
End: 2025-05-06
Payer: MEDICARE

## 2025-05-06 DIAGNOSIS — B37.31 VAGINAL YEAST INFECTION: Primary | ICD-10-CM

## 2025-05-06 PROCEDURE — RXMED WILLOW AMBULATORY MEDICATION CHARGE

## 2025-05-06 RX ORDER — FLUCONAZOLE 150 MG/1
150 TABLET ORAL ONCE
Qty: 1 TABLET | Refills: 0 | Status: SHIPPED | OUTPATIENT
Start: 2025-05-06 | End: 2025-05-07

## 2025-05-31 NOTE — TELEPHONE ENCOUNTER
Diflucan sent to pharmacy, please let patient know,    Sabrina Davey MD    
Pt states she believes she now has a yeast infection due to the antibiotics she was just on, and is asking if something could be called in to her pharmacy.  Please send to [facility].  Thank you  
No

## 2025-07-09 ENCOUNTER — APPOINTMENT (OUTPATIENT)
Facility: CLINIC | Age: 25
End: 2025-07-09
Payer: COMMERCIAL

## 2025-08-05 ENCOUNTER — PHARMACY VISIT (OUTPATIENT)
Dept: PHARMACY | Facility: CLINIC | Age: 25
End: 2025-08-05
Payer: MEDICARE

## 2025-08-05 PROCEDURE — RXMED WILLOW AMBULATORY MEDICATION CHARGE

## 2025-08-05 RX ORDER — FLUCONAZOLE 100 MG/1
TABLET ORAL
Qty: 2 TABLET | Refills: 0 | OUTPATIENT
Start: 2025-08-04

## 2025-08-05 RX ORDER — AMOXICILLIN AND CLAVULANATE POTASSIUM 875; 125 MG/1; MG/1
1 TABLET, FILM COATED ORAL
Qty: 14 TABLET | Refills: 0 | OUTPATIENT
Start: 2025-08-04 | End: 2025-08-12

## 2025-10-15 ENCOUNTER — APPOINTMENT (OUTPATIENT)
Dept: CARDIOLOGY | Facility: CLINIC | Age: 25
End: 2025-10-15
Payer: COMMERCIAL